# Patient Record
Sex: FEMALE | Race: WHITE | Employment: FULL TIME | URBAN - METROPOLITAN AREA
[De-identification: names, ages, dates, MRNs, and addresses within clinical notes are randomized per-mention and may not be internally consistent; named-entity substitution may affect disease eponyms.]

---

## 2017-05-31 ENCOUNTER — HOSPITAL ENCOUNTER (OUTPATIENT)
Age: 47
Discharge: HOME | End: 2017-05-31
Payer: COMMERCIAL

## 2017-05-31 ENCOUNTER — APPOINTMENT (OUTPATIENT)
Dept: ULTRASOUND IMAGING | Age: 47
End: 2017-05-31
Attending: PHYSICIAN ASSISTANT
Payer: COMMERCIAL

## 2017-05-31 ENCOUNTER — TELEPHONE (OUTPATIENT)
Dept: FAMILY MEDICINE CLINIC | Facility: CLINIC | Age: 47
End: 2017-05-31

## 2017-05-31 VITALS
HEIGHT: 62.5 IN | WEIGHT: 198 LBS | HEART RATE: 87 BPM | DIASTOLIC BLOOD PRESSURE: 101 MMHG | BODY MASS INDEX: 35.52 KG/M2 | OXYGEN SATURATION: 99 % | SYSTOLIC BLOOD PRESSURE: 154 MMHG | RESPIRATION RATE: 20 BRPM | TEMPERATURE: 99 F

## 2017-05-31 DIAGNOSIS — R52 PAIN: ICD-10-CM

## 2017-05-31 DIAGNOSIS — N92.0 HEAVY MENSTRUAL BLEEDING: ICD-10-CM

## 2017-05-31 DIAGNOSIS — N92.1 MENOMETRORRHAGIA: ICD-10-CM

## 2017-05-31 DIAGNOSIS — D50.9 IRON DEFICIENCY ANEMIA, UNSPECIFIED IRON DEFICIENCY ANEMIA TYPE: ICD-10-CM

## 2017-05-31 DIAGNOSIS — N83.202 OVARIAN CYST, LEFT: Primary | ICD-10-CM

## 2017-05-31 PROCEDURE — 81025 URINE PREGNANCY TEST: CPT | Performed by: PHYSICIAN ASSISTANT

## 2017-05-31 PROCEDURE — 96372 THER/PROPH/DIAG INJ SC/IM: CPT

## 2017-05-31 PROCEDURE — 85025 COMPLETE CBC W/AUTO DIFF WBC: CPT | Performed by: PHYSICIAN ASSISTANT

## 2017-05-31 PROCEDURE — 36415 COLL VENOUS BLD VENIPUNCTURE: CPT

## 2017-05-31 PROCEDURE — 99214 OFFICE O/P EST MOD 30 MIN: CPT

## 2017-05-31 PROCEDURE — 80047 BASIC METABLC PNL IONIZED CA: CPT

## 2017-05-31 PROCEDURE — 76830 TRANSVAGINAL US NON-OB: CPT | Performed by: PHYSICIAN ASSISTANT

## 2017-05-31 PROCEDURE — 81002 URINALYSIS NONAUTO W/O SCOPE: CPT | Performed by: PHYSICIAN ASSISTANT

## 2017-05-31 PROCEDURE — 99215 OFFICE O/P EST HI 40 MIN: CPT

## 2017-05-31 PROCEDURE — 76856 US EXAM PELVIC COMPLETE: CPT | Performed by: PHYSICIAN ASSISTANT

## 2017-05-31 RX ORDER — IBUPROFEN 800 MG/1
800 TABLET ORAL EVERY 8 HOURS PRN
Qty: 30 TABLET | Refills: 0 | Status: SHIPPED | OUTPATIENT
Start: 2017-05-31 | End: 2017-06-07

## 2017-05-31 RX ORDER — KETOROLAC TROMETHAMINE 30 MG/ML
60 INJECTION, SOLUTION INTRAMUSCULAR; INTRAVENOUS ONCE
Status: COMPLETED | OUTPATIENT
Start: 2017-05-31 | End: 2017-05-31

## 2017-05-31 NOTE — TELEPHONE ENCOUNTER
Pt had her period for 8 days (heavy) ended on 5/20/17. She just got it agin today and has burning sensation and bloody nose. What to do?

## 2017-05-31 NOTE — TELEPHONE ENCOUNTER
LOV 11/7/16 acute, physical 1/21/16. Starling Setting has a few complaints this morning. Menses ended 10 days ago but vaginal bleeding began again yesterday. This morning bleeding was heavier along with burning in the low abdomen at a pain level of 8.   She had a b

## 2017-05-31 NOTE — ED INITIAL ASSESSMENT (HPI)
Pt had her menstrual period on the may 21 then again may 30. Pt states its different. Also c/o burning in the stomach. Denies any urinary problems.

## 2017-05-31 NOTE — ED PROVIDER NOTES
Patient Seen in: [facility]    History   Patient presents with:  Menstrual Problem    Stated Complaint: IRREGULAR MENSES,BURNING VS CRAMPING,BLOODY NOSE THIS MORN    HPI    40 yo female here with c/o a irregular period-PT report Smokeless Status: Never Used                        Alcohol Use: Yes           0.0 oz/week       0 Standard drinks or equivalent per week       Comment: 0-1 drink per month      Review of Systems    Positive for stated complaint: IRREGULAR MEN normal limits   POCT CBC - Abnormal; Notable for the following:     RBC IC 5.44 (*)     HGB IC 10.0 (*)     HCT IC 34.6 (*)     MCV IC 63.6 (*)     MCH IC <20.0 (*)     MCHC IC 28.9 (*)     All other components within normal limits   POCT ISTAT CHEM8 CARTR

## 2017-06-02 ENCOUNTER — TELEPHONE (OUTPATIENT)
Dept: FAMILY MEDICINE CLINIC | Facility: CLINIC | Age: 47
End: 2017-06-02

## 2017-06-02 RX ORDER — NAPROXEN 500 MG/1
500 TABLET ORAL 2 TIMES DAILY WITH MEALS
Qty: 30 TABLET | Refills: 0 | Status: ON HOLD | OUTPATIENT
Start: 2017-06-02 | End: 2017-06-15

## 2017-06-02 NOTE — TELEPHONE ENCOUNTER
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  At urgent care they gave her ibuprofen that is not helping and is wanting to know if there is something else that can be pre

## 2017-06-02 NOTE — TELEPHONE ENCOUNTER
With her allergy hx the only thing I can recommend at this time is switching from ibuprofen to Naproxen 500 mg BID with food. #30 tabs can be called in to her pharmacy.

## 2017-06-02 NOTE — TELEPHONE ENCOUNTER
Called and talked to patient she is taking the motrin but it is not helping the pain she is wondering if she can get something else for the pain until she sees the specialist on Tuesday/ I will ask Jess BURRIS to address this.

## 2017-06-06 ENCOUNTER — OFFICE VISIT (OUTPATIENT)
Dept: OBGYN CLINIC | Facility: CLINIC | Age: 47
End: 2017-06-06

## 2017-06-06 VITALS
HEIGHT: 62 IN | SYSTOLIC BLOOD PRESSURE: 122 MMHG | WEIGHT: 212 LBS | HEART RATE: 96 BPM | BODY MASS INDEX: 39.01 KG/M2 | DIASTOLIC BLOOD PRESSURE: 72 MMHG

## 2017-06-06 DIAGNOSIS — Z01.419 WELL WOMAN EXAM WITH ROUTINE GYNECOLOGICAL EXAM: Primary | ICD-10-CM

## 2017-06-06 DIAGNOSIS — N83.209 CYST OF OVARY: ICD-10-CM

## 2017-06-06 DIAGNOSIS — Z87.42 HX OF MENORRHAGIA: ICD-10-CM

## 2017-06-06 DIAGNOSIS — Z12.31 VISIT FOR SCREENING MAMMOGRAM: ICD-10-CM

## 2017-06-06 PROCEDURE — 87624 HPV HI-RISK TYP POOLED RSLT: CPT | Performed by: OBSTETRICS & GYNECOLOGY

## 2017-06-06 PROCEDURE — 88175 CYTOPATH C/V AUTO FLUID REDO: CPT | Performed by: OBSTETRICS & GYNECOLOGY

## 2017-06-06 PROCEDURE — 99386 PREV VISIT NEW AGE 40-64: CPT | Performed by: OBSTETRICS & GYNECOLOGY

## 2017-06-06 NOTE — PROGRESS NOTES
Cecy Puente is a 41year old female  Patient's last menstrual period was 2017 (exact date). Patient presents with:  Gyn Problem: u/s results in EPIC  . Patient has periods that are usually 8-9 days she bleeds every month.   Does not use an Comment: 0-1 drink per month    Drug Use: No    Sexual Activity: Not on file   Not on file  Other Topics Concern    Caffeine Concern Yes    Comment: 6oz daily    Exercise Yes    Comment: 3 d/wk    Seat Belt Yes    Self-Exams Yes    Comment: self breast exa masses  Skin/Hair: no unusual rashes or bruises   Extremities: no edema, no cyanosis  Psychiatric:  Oriented to time, place, person and situation.  Appropriate mood and affect    Pelvic Exam:  External Genitalia: normal appearance, hair distribution, and no

## 2017-06-07 ENCOUNTER — APPOINTMENT (OUTPATIENT)
Dept: LAB | Facility: HOSPITAL | Age: 47
End: 2017-06-07
Attending: OBSTETRICS & GYNECOLOGY
Payer: COMMERCIAL

## 2017-06-07 DIAGNOSIS — Z12.31 VISIT FOR SCREENING MAMMOGRAM: ICD-10-CM

## 2017-06-07 DIAGNOSIS — Z01.419 WELL WOMAN EXAM WITH ROUTINE GYNECOLOGICAL EXAM: ICD-10-CM

## 2017-06-07 DIAGNOSIS — N83.209 CYST OF OVARY: ICD-10-CM

## 2017-06-07 DIAGNOSIS — Z87.42 HX OF MENORRHAGIA: ICD-10-CM

## 2017-06-07 PROCEDURE — 36415 COLL VENOUS BLD VENIPUNCTURE: CPT

## 2017-06-07 PROCEDURE — 84443 ASSAY THYROID STIM HORMONE: CPT

## 2017-06-07 PROCEDURE — 86304 IMMUNOASSAY TUMOR CA 125: CPT

## 2017-06-09 ENCOUNTER — TELEPHONE (OUTPATIENT)
Dept: OBGYN CLINIC | Facility: CLINIC | Age: 47
End: 2017-06-09

## 2017-06-09 RX ORDER — HEPARIN SODIUM 5000 [USP'U]/ML
5000 INJECTION, SOLUTION INTRAVENOUS; SUBCUTANEOUS ONCE
Status: CANCELLED | OUTPATIENT
Start: 2017-06-09 | End: 2017-06-09

## 2017-06-09 RX ORDER — IBUPROFEN 800 MG/1
800 TABLET ORAL EVERY 6 HOURS PRN
Status: ON HOLD | COMMUNITY
End: 2017-06-15

## 2017-06-09 NOTE — TELEPHONE ENCOUNTER
Pt would like to have a hysterectomy instead of ablation. Currently ablation and laparoscopic systectomy is scheduled 06/29/17, pt states cannot wait this long.  Will check with MD

## 2017-06-13 ENCOUNTER — TELEPHONE (OUTPATIENT)
Dept: OBGYN CLINIC | Facility: CLINIC | Age: 47
End: 2017-06-13

## 2017-06-13 ENCOUNTER — OFFICE VISIT (OUTPATIENT)
Dept: OBGYN CLINIC | Facility: CLINIC | Age: 47
End: 2017-06-13

## 2017-06-13 VITALS
SYSTOLIC BLOOD PRESSURE: 130 MMHG | BODY MASS INDEX: 39.01 KG/M2 | HEIGHT: 62 IN | DIASTOLIC BLOOD PRESSURE: 70 MMHG | WEIGHT: 212 LBS | HEART RATE: 88 BPM | RESPIRATION RATE: 18 BRPM

## 2017-06-13 DIAGNOSIS — R35.0 FREQUENT URINATION: ICD-10-CM

## 2017-06-13 DIAGNOSIS — R35.0 FREQUENT URINATION: Primary | ICD-10-CM

## 2017-06-13 DIAGNOSIS — N83.209 OVARIAN CYST: ICD-10-CM

## 2017-06-13 DIAGNOSIS — R10.2 PELVIC PAIN: ICD-10-CM

## 2017-06-13 DIAGNOSIS — N39.0 URINARY TRACT INFECTION, SITE UNSPECIFIED: Primary | ICD-10-CM

## 2017-06-13 PROCEDURE — 87086 URINE CULTURE/COLONY COUNT: CPT | Performed by: OBSTETRICS & GYNECOLOGY

## 2017-06-13 PROCEDURE — 99213 OFFICE O/P EST LOW 20 MIN: CPT | Performed by: OBSTETRICS & GYNECOLOGY

## 2017-06-13 PROCEDURE — 81003 URINALYSIS AUTO W/O SCOPE: CPT | Performed by: OBSTETRICS & GYNECOLOGY

## 2017-06-13 RX ORDER — HEPARIN SODIUM 5000 [USP'U]/ML
5000 INJECTION, SOLUTION INTRAVENOUS; SUBCUTANEOUS ONCE
Status: CANCELLED | OUTPATIENT
Start: 2017-06-13 | End: 2017-06-13

## 2017-06-13 RX ORDER — TRAMADOL HYDROCHLORIDE 50 MG/1
50 TABLET ORAL EVERY 6 HOURS PRN
Status: ON HOLD | COMMUNITY
End: 2017-06-15

## 2017-06-13 NOTE — PROGRESS NOTES
Antonia Thomas is a 41year old female  Patient's last menstrual period was 2017 (exact date).  Patient presents with:  Pre-Op Exam: ovarian cyst.   .  C/o severe pelvic pain , take pain medications around the clock, scheduled for ovarian cysteco Concern Yes    Comment: 6oz daily    Exercise Yes    Comment: 3 d/wk    Seat Belt Yes    Self-Exams Yes    Comment: self breast exam once a week     Social History Narrative       FAMILY HISTORY:  No family history on file.     MEDICATIONS:    Current outpa ovarian avascular anechoic cyst measuring 6.8 x 4.6 x 6.2 cm. There was a 2.8 x 2.5 cm cyst within the left ovary seen on a CT dated 9/11/16. If this is the same cyst this is obviously larger.  The differential diagnosis   would include simple cyst, cystic

## 2017-06-13 NOTE — TELEPHONE ENCOUNTER
Dr. Rissa Burkett,    Did you want to send out this patients urine for a culture? If so can you please enter in the order. Thank you.

## 2017-06-15 ENCOUNTER — ANESTHESIA EVENT (OUTPATIENT)
Dept: SURGERY | Facility: HOSPITAL | Age: 47
DRG: 743 | End: 2017-06-15
Payer: COMMERCIAL

## 2017-06-15 ENCOUNTER — HOSPITAL ENCOUNTER (INPATIENT)
Facility: HOSPITAL | Age: 47
LOS: 3 days | Discharge: HOME | DRG: 743 | End: 2017-06-18
Attending: OBSTETRICS & GYNECOLOGY | Admitting: OBSTETRICS & GYNECOLOGY
Payer: COMMERCIAL

## 2017-06-15 ENCOUNTER — ANESTHESIA (OUTPATIENT)
Dept: SURGERY | Facility: HOSPITAL | Age: 47
DRG: 743 | End: 2017-06-15
Payer: COMMERCIAL

## 2017-06-15 ENCOUNTER — SURGERY (OUTPATIENT)
Age: 47
End: 2017-06-15

## 2017-06-15 PROBLEM — Z90.710 S/P TAH (TOTAL ABDOMINAL HYSTERECTOMY): Status: ACTIVE | Noted: 2017-06-15

## 2017-06-15 PROCEDURE — 0UT10ZZ RESECTION OF LEFT OVARY, OPEN APPROACH: ICD-10-PCS | Performed by: OBSTETRICS & GYNECOLOGY

## 2017-06-15 PROCEDURE — 0UT90ZZ RESECTION OF UTERUS, OPEN APPROACH: ICD-10-PCS | Performed by: OBSTETRICS & GYNECOLOGY

## 2017-06-15 PROCEDURE — 58150 TOTAL HYSTERECTOMY: CPT | Performed by: OBSTETRICS & GYNECOLOGY

## 2017-06-15 PROCEDURE — 0UT70ZZ RESECTION OF BILATERAL FALLOPIAN TUBES, OPEN APPROACH: ICD-10-PCS | Performed by: OBSTETRICS & GYNECOLOGY

## 2017-06-15 PROCEDURE — 0UTC0ZZ RESECTION OF CERVIX, OPEN APPROACH: ICD-10-PCS | Performed by: OBSTETRICS & GYNECOLOGY

## 2017-06-15 RX ORDER — DIPHENHYDRAMINE HYDROCHLORIDE 50 MG/ML
12.5 INJECTION INTRAMUSCULAR; INTRAVENOUS EVERY 4 HOURS PRN
Status: DISCONTINUED | OUTPATIENT
Start: 2017-06-15 | End: 2017-06-18

## 2017-06-15 RX ORDER — MIDAZOLAM HYDROCHLORIDE 1 MG/ML
INJECTION INTRAMUSCULAR; INTRAVENOUS
Status: COMPLETED
Start: 2017-06-15 | End: 2017-06-15

## 2017-06-15 RX ORDER — SIMETHICONE 80 MG
80 TABLET,CHEWABLE ORAL EVERY 8 HOURS PRN
Status: DISCONTINUED | OUTPATIENT
Start: 2017-06-15 | End: 2017-06-18

## 2017-06-15 RX ORDER — DEXTROSE, SODIUM CHLORIDE, SODIUM LACTATE, POTASSIUM CHLORIDE, AND CALCIUM CHLORIDE 5; .6; .31; .03; .02 G/100ML; G/100ML; G/100ML; G/100ML; G/100ML
INJECTION, SOLUTION INTRAVENOUS CONTINUOUS
Status: DISCONTINUED | OUTPATIENT
Start: 2017-06-15 | End: 2017-06-17

## 2017-06-15 RX ORDER — HYDROCODONE BITARTRATE AND ACETAMINOPHEN 5; 325 MG/1; MG/1
2 TABLET ORAL EVERY 4 HOURS PRN
Status: DISCONTINUED | OUTPATIENT
Start: 2017-06-15 | End: 2017-06-18

## 2017-06-15 RX ORDER — IBUPROFEN 600 MG/1
600 TABLET ORAL EVERY 6 HOURS PRN
Status: DISCONTINUED | OUTPATIENT
Start: 2017-06-15 | End: 2017-06-18

## 2017-06-15 RX ORDER — ONDANSETRON 4 MG/1
4 TABLET, FILM COATED ORAL EVERY 8 HOURS PRN
Status: DISCONTINUED | OUTPATIENT
Start: 2017-06-15 | End: 2017-06-18

## 2017-06-15 RX ORDER — HYDROCODONE BITARTRATE AND ACETAMINOPHEN 5; 325 MG/1; MG/1
1 TABLET ORAL EVERY 4 HOURS PRN
Status: DISCONTINUED | OUTPATIENT
Start: 2017-06-15 | End: 2017-06-18

## 2017-06-15 RX ORDER — HEPARIN SODIUM 5000 [USP'U]/ML
5000 INJECTION, SOLUTION INTRAVENOUS; SUBCUTANEOUS ONCE
Status: COMPLETED | OUTPATIENT
Start: 2017-06-15 | End: 2017-06-15

## 2017-06-15 RX ORDER — ONDANSETRON 2 MG/ML
4 INJECTION INTRAMUSCULAR; INTRAVENOUS EVERY 6 HOURS PRN
Status: DISCONTINUED | OUTPATIENT
Start: 2017-06-15 | End: 2017-06-18

## 2017-06-15 RX ORDER — ZOLPIDEM TARTRATE 5 MG/1
5 TABLET ORAL NIGHTLY PRN
Status: DISCONTINUED | OUTPATIENT
Start: 2017-06-15 | End: 2017-06-18

## 2017-06-15 RX ORDER — ONDANSETRON 2 MG/ML
4 INJECTION INTRAMUSCULAR; INTRAVENOUS AS NEEDED
Status: DISCONTINUED | OUTPATIENT
Start: 2017-06-15 | End: 2017-06-15 | Stop reason: HOSPADM

## 2017-06-15 RX ORDER — NALBUPHINE HCL 10 MG/ML
2.5 AMPUL (ML) INJECTION EVERY 4 HOURS PRN
Status: DISCONTINUED | OUTPATIENT
Start: 2017-06-15 | End: 2017-06-18

## 2017-06-15 RX ORDER — METOCLOPRAMIDE HYDROCHLORIDE 5 MG/ML
10 INJECTION INTRAMUSCULAR; INTRAVENOUS AS NEEDED
Status: DISCONTINUED | OUTPATIENT
Start: 2017-06-15 | End: 2017-06-15 | Stop reason: HOSPADM

## 2017-06-15 RX ORDER — ONDANSETRON 2 MG/ML
INJECTION INTRAMUSCULAR; INTRAVENOUS
Status: COMPLETED
Start: 2017-06-15 | End: 2017-06-15

## 2017-06-15 RX ORDER — NALOXONE HYDROCHLORIDE 0.4 MG/ML
80 INJECTION, SOLUTION INTRAMUSCULAR; INTRAVENOUS; SUBCUTANEOUS AS NEEDED
Status: DISCONTINUED | OUTPATIENT
Start: 2017-06-15 | End: 2017-06-15 | Stop reason: HOSPADM

## 2017-06-15 RX ORDER — HYDROMORPHONE HYDROCHLORIDE 1 MG/ML
0.4 INJECTION, SOLUTION INTRAMUSCULAR; INTRAVENOUS; SUBCUTANEOUS EVERY 30 MIN PRN
Status: DISCONTINUED | OUTPATIENT
Start: 2017-06-15 | End: 2017-06-18

## 2017-06-15 RX ORDER — SODIUM CHLORIDE, SODIUM LACTATE, POTASSIUM CHLORIDE, CALCIUM CHLORIDE 600; 310; 30; 20 MG/100ML; MG/100ML; MG/100ML; MG/100ML
INJECTION, SOLUTION INTRAVENOUS CONTINUOUS
Status: DISCONTINUED | OUTPATIENT
Start: 2017-06-15 | End: 2017-06-15 | Stop reason: HOSPADM

## 2017-06-15 RX ORDER — MEPERIDINE HYDROCHLORIDE 25 MG/ML
INJECTION INTRAMUSCULAR; INTRAVENOUS; SUBCUTANEOUS
Status: COMPLETED
Start: 2017-06-15 | End: 2017-06-15

## 2017-06-15 RX ORDER — MEPERIDINE HYDROCHLORIDE 25 MG/ML
12.5 INJECTION INTRAMUSCULAR; INTRAVENOUS; SUBCUTANEOUS AS NEEDED
Status: COMPLETED | OUTPATIENT
Start: 2017-06-15 | End: 2017-06-15

## 2017-06-15 RX ORDER — MIDAZOLAM HYDROCHLORIDE 1 MG/ML
1 INJECTION INTRAMUSCULAR; INTRAVENOUS EVERY 5 MIN PRN
Status: DISCONTINUED | OUTPATIENT
Start: 2017-06-15 | End: 2017-06-15 | Stop reason: HOSPADM

## 2017-06-15 RX ORDER — METOCLOPRAMIDE HYDROCHLORIDE 5 MG/ML
INJECTION INTRAMUSCULAR; INTRAVENOUS
Status: COMPLETED
Start: 2017-06-15 | End: 2017-06-15

## 2017-06-15 RX ORDER — SODIUM CHLORIDE, SODIUM LACTATE, POTASSIUM CHLORIDE, CALCIUM CHLORIDE 600; 310; 30; 20 MG/100ML; MG/100ML; MG/100ML; MG/100ML
INJECTION, SOLUTION INTRAVENOUS CONTINUOUS
Status: DISCONTINUED | OUTPATIENT
Start: 2017-06-15 | End: 2017-06-17

## 2017-06-15 RX ORDER — HEPARIN SODIUM 5000 [USP'U]/ML
INJECTION, SOLUTION INTRAVENOUS; SUBCUTANEOUS
Status: DISPENSED
Start: 2017-06-15 | End: 2017-06-16

## 2017-06-15 RX ORDER — HYDROMORPHONE HYDROCHLORIDE 1 MG/ML
0.4 INJECTION, SOLUTION INTRAMUSCULAR; INTRAVENOUS; SUBCUTANEOUS EVERY 5 MIN PRN
Status: DISCONTINUED | OUTPATIENT
Start: 2017-06-15 | End: 2017-06-15 | Stop reason: HOSPADM

## 2017-06-15 RX ORDER — DOCUSATE SODIUM 100 MG/1
100 CAPSULE, LIQUID FILLED ORAL 2 TIMES DAILY
Status: DISCONTINUED | OUTPATIENT
Start: 2017-06-15 | End: 2017-06-18

## 2017-06-15 RX ORDER — ONDANSETRON 2 MG/ML
4 INJECTION INTRAMUSCULAR; INTRAVENOUS EVERY 8 HOURS PRN
Status: DISCONTINUED | OUTPATIENT
Start: 2017-06-15 | End: 2017-06-18

## 2017-06-15 RX ORDER — HYDROMORPHONE HYDROCHLORIDE 1 MG/ML
INJECTION, SOLUTION INTRAMUSCULAR; INTRAVENOUS; SUBCUTANEOUS
Status: COMPLETED
Start: 2017-06-15 | End: 2017-06-15

## 2017-06-15 RX ORDER — NALOXONE HYDROCHLORIDE 0.4 MG/ML
0.08 INJECTION, SOLUTION INTRAMUSCULAR; INTRAVENOUS; SUBCUTANEOUS
Status: DISCONTINUED | OUTPATIENT
Start: 2017-06-15 | End: 2017-06-18

## 2017-06-15 RX ORDER — METOCLOPRAMIDE HYDROCHLORIDE 5 MG/ML
10 INJECTION INTRAMUSCULAR; INTRAVENOUS EVERY 8 HOURS PRN
Status: DISCONTINUED | OUTPATIENT
Start: 2017-06-15 | End: 2017-06-18

## 2017-06-15 NOTE — BRIEF OP NOTE
Pre-Operative Diagnosis: Left ovarian cyst, pelvic pain, heavy menses     Post-Operative Diagnosis: Left ovarian cyst, pelvic pain, heavy menses     Procedure Performed:   Procedure(s):  TOTAL ABDOMINAL HYSTERECTOMY, BILATERAL SALPINGECTOMY, LEFT OOPHER

## 2017-06-15 NOTE — ANESTHESIA POSTPROCEDURE EVALUATION
[address] Patient Status:  Hospital Outpatient Surgery   Age/Gender 41year old female MRN [id number]   [facility] SUR[facility]ERY Attending Manny Fam, [address] [facility] Day # 0 PCP Ava Alarcon MD       Anesthesia Post-op N

## 2017-06-15 NOTE — H&P
Tamiko Kahn is a 41year old female  Patient's last menstrual period was 2017 (exact date). No chief complaint on file.   .  C/o severe pelvic pain , take pain medications around the clock, scheduled for ovarian cystecomy and possible novasure daily    Exercise Yes    Comment: 3 d/wk    Seat Belt Yes    Self-Exams Yes    Comment: self breast exam once a week     Social History Narrative       FAMILY HISTORY:  History reviewed. No pertinent family history.     MEDICATIONS:  No current outpatient p fullness, masses or tenderness  Vagina:  Normal appearance without lesions, no abnormal discharge  Cervix:  Normal without tenderness on motion  Uterus: tender to palpation  Adnexa: left adnexal fullness and tenderness  Perineum: normal  Anus: no hemorroid

## 2017-06-15 NOTE — ANESTHESIA PREPROCEDURE EVALUATION
PRE-OP EVALUATION    Patient Name: Tyrell Davis    Pre-op Diagnosis: OVARIAN CYST    Procedure(s):  TOTAL ABDOMINAL HYSTERECTOMY    Surgeon(s) and Role:     * Juliette Arevalo DO - Primary     * Bryan Calderon MD - Assisting Surgeon    Pre-op vitals Sergei Skill Surgical History    OTHER SURGICAL HISTORY  6/8/2011    Comment liver biopsy    TONSILLECTOMY  2/2003    APPENDECTOMY      Comment 9/2016        Smoking status: Never Smoker     Smokeless tobacco: Never Used    Alcohol Use: Yes  0.0 oz/week    0 Standard d

## 2017-06-16 NOTE — PROGRESS NOTES
HD#1  Pain is controlled wit PCA, no nausea  /84 mmHg  Pulse 81  Temp(Src) 97.9 °F (36.6 °C) (Oral)  Resp 20  Ht 62\"  Wt 203 lb  BMI 37.12 kg/m2  SpO2 100%  LMP 05/30/2017 (Exact Date)    Recent Labs   Lab  06/16/17   0554   RBC  4.59   HGB  8.2*

## 2017-06-16 NOTE — PLAN OF CARE
Verbalizes/displays adequate comfort level or patient's stated pain goal Progressing      Incision(s), wounds(s) or drain site(s) healing without S/S of infection Progressing      Pt resting in bed this morning, reporting abdominal pressure.  Pt using pca,

## 2017-06-16 NOTE — PROGRESS NOTES
[facility] Pharmacy Note:  Pain Consult    Carlton De La Cruz is a 41year old female started on Dilaudid PCA by Dr. Yuliya Sarmiento. Pharmacy was consulted to review medication profile and to discontinue previously ordered narcotics and sedatives.     Medication profil

## 2017-06-16 NOTE — PAYOR COMM NOTE
--------------  ADMISSION REVIEW     Payor: Maria C Flor  #:  [id number]  Authorization Number: [id number]    Admit date: 6/15/2017  2:35 PM       Admitting Physician: Bola Castaneda DO  Attending Physician:  DO Holly Siegel liver biopsy   • Tonsillectomy  2/2003   • Appendectomy       9/2016       SOCIAL HISTORY:    Social History   Marital Status:   Spouse Name: N/A    Years of Education: N/A  Number of Children: N/A     Occupational History                FAM no lesions  Urethral Meatus:  normal in size, location, without lesions and prolapse  Bladder:  No fullness, masses or tenderness  Vagina:  Normal appearance without lesions, no abnormal discharge  Cervix:  Normal without tenderness on motion  Uterus: tend (DEMEROL) 25 MG/ML injection 12.5 mg     Date Action Dose Route User    6/15/2017 1818 Given 12.5 mg Intravenous Jerald English RN    6/15/2017 1813 Given 12.5 mg Intravenous Nicolás Melvin RN      Metoclopramide HCl (REGLAN) injection 10 mg     Date Act

## 2017-06-17 RX ORDER — KETOROLAC TROMETHAMINE 30 MG/ML
30 INJECTION, SOLUTION INTRAMUSCULAR; INTRAVENOUS EVERY 6 HOURS PRN
Status: DISCONTINUED | OUTPATIENT
Start: 2017-06-17 | End: 2017-06-18

## 2017-06-17 NOTE — PLAN OF CARE
DISCHARGE PLANNING    • Discharge to home or other facility with appropriate resources Progressing        RISK FOR INFECTION - ADULT    • Absence of fever/infection during anticipated neutropenic period Progressing        SAFETY ADULT - FALL    • Free from

## 2017-06-17 NOTE — PROGRESS NOTES
[facility]  Progress Note    Tyrell Davis Patient Status:  Inpatient    1970 MRN [id number]   [facility] 3NW-A Attending Juliette Arevalo, [address] [facility] Day # 2 PCP Minerva Ramirez MD     Subjective:  C/o of pain and nausea.  Does n

## 2017-06-17 NOTE — PLAN OF CARE
Pt continues to c/o increased abdominal pain and avoiding using PCA for suspected cause of nausea this am. Pt has not attempted to eat since this am. Pt encouraged to try full liquid menu now.  Dr. Elmira Sandy paged and notified of pain and nausea this am. PCA d/c

## 2017-06-18 VITALS
TEMPERATURE: 98 F | SYSTOLIC BLOOD PRESSURE: 148 MMHG | HEIGHT: 62 IN | WEIGHT: 203 LBS | RESPIRATION RATE: 18 BRPM | HEART RATE: 87 BPM | OXYGEN SATURATION: 98 % | DIASTOLIC BLOOD PRESSURE: 94 MMHG | BODY MASS INDEX: 37.36 KG/M2

## 2017-06-18 RX ORDER — HYDROCODONE BITARTRATE AND ACETAMINOPHEN 5; 325 MG/1; MG/1
1 TABLET ORAL EVERY 4 HOURS PRN
Qty: 30 TABLET | Refills: 0 | Status: SHIPPED | OUTPATIENT
Start: 2017-06-18 | End: 2017-07-27

## 2017-06-18 NOTE — PLAN OF CARE
DISCHARGE PLANNING    • Discharge to home or other facility with appropriate resources Adequate for Discharge        GASTROINTESTINAL - ADULT    • Minimal or absence of nausea and vomiting Adequate for Discharge    • Maintains or returns to baseline bowel

## 2017-06-18 NOTE — DISCHARGE SUMMARY
[facility]  Discharge Summary    Antonia Thomas Patient Status:  Inpatient    1970 MRN [id number]   [facility] 3NW-A Attending Rahel Lopez, [address] [facility] Day # 3 PCP Joselito Langston MD     Date of Admission: 6/15/2017    Date of

## 2017-06-20 NOTE — OPERATIVE REPORT
[facility]    PATIENT'S NAME: Nicole Maren   ATTENDING PHYSICIAN: [facility]   OPERATING PHYSICIAN: [facility]   PATIENT ACCOUNT#:   [id number]    LOCATION:  [address] [place] [address]  MEDICAL RECORD #:   [id number]       DATE OF BIRTH: then skeletonized bilaterally, transected and suture ligated bilaterally with good hemostasis. Uterosacral and cardinal ligaments transected and suture ligated. Uterus and cervix amputated using Cortez scissors.   Vaginal cuff was then closed with 0 Vi

## 2017-06-20 NOTE — PAYOR COMM NOTE
--------------  DISCHARGE REVIEW    Payor: Maria C Flor  #:  [id number]  Authorization Number: [id number]    Admit date: 6/15/2017  2:35 PM  Discharge Date: 6/18/2017  1:00 PM     Admitting Physician: DO Twan Mckeon contact dermatitis due to detergent    acyclovir 5 % External Ointment  Apply thin layer to affected area every 3 hours until resolved  Qty: 5 g Refills: 3  Associated Diagnoses:Herpes labialis    RaNITidine HCl 150 MG Oral Tab  Take 1 tablet (150 mg total PCA. Advance diet as tolerated.  Probable D/c tomorrow     Patient Active Problem List:     Trigeminal neuralgia     Anemia, unspecified     Primary biliary cirrhosis (HCC)     Vitamin D deficiency     Well woman exam with routine gynecological exam     Cys

## 2017-07-14 ENCOUNTER — OFFICE VISIT (OUTPATIENT)
Dept: OBGYN CLINIC | Facility: CLINIC | Age: 47
End: 2017-07-14

## 2017-07-14 VITALS
BODY MASS INDEX: 36.8 KG/M2 | RESPIRATION RATE: 16 BRPM | HEIGHT: 62 IN | WEIGHT: 200 LBS | HEART RATE: 100 BPM | DIASTOLIC BLOOD PRESSURE: 100 MMHG | SYSTOLIC BLOOD PRESSURE: 138 MMHG

## 2017-07-14 DIAGNOSIS — IMO0001: Primary | ICD-10-CM

## 2017-07-14 NOTE — PROGRESS NOTES
Tanja Marques is a 41year old female  Patient's last menstrual period was 2017 (exact date). Patient presents with:  Post-Op: incision is bleeding   .   S/p BENITEZ RSO 6/15/17 c/o small opening at the incision with bloody discharge, no fever  OBST Prescriptions:   •  triamcinolone acetonide 0.1 % External Cream, Apply topically 2 (two) times daily as needed. , Disp: 60 g, Rfl: 1  •  acyclovir 5 % External Ointment, Apply thin layer to affected area every 3 hours until resolved, Disp: 5 g, Rfl: 3  •

## 2017-07-27 ENCOUNTER — OFFICE VISIT (OUTPATIENT)
Dept: OBGYN CLINIC | Facility: CLINIC | Age: 47
End: 2017-07-27

## 2017-07-27 VITALS
WEIGHT: 202 LBS | BODY MASS INDEX: 37.17 KG/M2 | HEART RATE: 80 BPM | DIASTOLIC BLOOD PRESSURE: 60 MMHG | SYSTOLIC BLOOD PRESSURE: 120 MMHG | HEIGHT: 62 IN

## 2017-07-27 DIAGNOSIS — Z09 POSTOP CHECK: Primary | ICD-10-CM

## 2017-07-27 PROCEDURE — 99024 POSTOP FOLLOW-UP VISIT: CPT | Performed by: OBSTETRICS & GYNECOLOGY

## 2017-07-27 NOTE — PROGRESS NOTES
Reviewed surgerical pictures, events, path report. All questions answered.  Released from pelvic rest  Incisions healed  Patient has no complaints    Pelvic: vaginal cuff healed, no adnexal mass no tenderness    A/P; s/p BENITEZ BS LSO  - doing well  - f/u prn

## 2017-08-30 NOTE — PLAN OF CARE
DISCHARGE PLANNING    • Discharge to home or other facility with appropriate resources Progressing        GASTROINTESTINAL - ADULT    • Minimal or absence of nausea and vomiting Progressing    • Maintains or returns to baseline bowel function Progressing none

## 2017-11-05 ENCOUNTER — OFFICE VISIT (OUTPATIENT)
Dept: FAMILY MEDICINE CLINIC | Facility: CLINIC | Age: 47
End: 2017-11-05

## 2017-11-05 VITALS
RESPIRATION RATE: 16 BRPM | DIASTOLIC BLOOD PRESSURE: 86 MMHG | OXYGEN SATURATION: 98 % | HEART RATE: 114 BPM | BODY MASS INDEX: 38.16 KG/M2 | HEIGHT: 62 IN | SYSTOLIC BLOOD PRESSURE: 134 MMHG | WEIGHT: 207.38 LBS | TEMPERATURE: 98 F

## 2017-11-05 DIAGNOSIS — J40 BRONCHITIS: Primary | ICD-10-CM

## 2017-11-05 PROCEDURE — 99213 OFFICE O/P EST LOW 20 MIN: CPT | Performed by: NURSE PRACTITIONER

## 2017-11-05 RX ORDER — PREDNISONE 20 MG/1
20 TABLET ORAL 2 TIMES DAILY
Qty: 10 TABLET | Refills: 0 | Status: SHIPPED | OUTPATIENT
Start: 2017-11-05 | End: 2017-11-10

## 2017-11-05 RX ORDER — DOXYCYCLINE HYCLATE 100 MG
100 TABLET ORAL 2 TIMES DAILY
Qty: 14 TABLET | Refills: 0 | Status: SHIPPED | OUTPATIENT
Start: 2017-11-05 | End: 2017-11-12

## 2017-11-05 RX ORDER — CODEINE PHOSPHATE AND GUAIFENESIN 10; 100 MG/5ML; MG/5ML
10 SOLUTION ORAL NIGHTLY PRN
Qty: 118 ML | Refills: 0 | Status: SHIPPED | OUTPATIENT
Start: 2017-11-05 | End: 2017-11-12

## 2017-11-05 NOTE — PATIENT INSTRUCTIONS
Humidifier in room  Sleep propped  Push fluids  Limit dairy  Mucinex as directed    Bronchitis, Antibiotic Treatment (Adult)    Bronchitis is an infection of the air passages (bronchial tubes) in your lungs. It often occurs when you have a cold.  This ill · Finish all antibiotic medicine. Do this even if you are feeling better after only a few days. Follow-up care  Follow up with your healthcare provider, or as advised. If you had an X-ray or ECG (electrocardiogram), a specialist will review it.  You will b

## 2017-11-05 NOTE — PROGRESS NOTES
CHIEF COMPLAINT:   Patient presents with:  Cough: cough, aches, difficulty sleeping, chest pain, stuffy nose, fatigue, cannot cough up phlegm x2 weeks Pt has not taken meds, just cough drops        HPI:   Antonia Thomas is a 41year old female who present Smoking status: Never Smoker                                                              Smokeless tobacco: Never Used                      Alcohol use: Yes           0.0 oz/week     Comment: 0-1 drink per month       REVIEW OF SYSTEMS:   GENERAL: denies Side effects, risks, benefits, of medication explained and discussed.     Patient Instructions     Humidifier in room  Sleep propped  Push fluids  Limit dairy  Mucinex as directed    Bronchitis, Antibiotic Treatment (Adult)    Bronchitis is an infecti · Over-the-counter cough, cold, and sore-throat medicines will not shorten the length of the illness, but they may be helpful to reduce symptoms. (Note: Do not use decongestants if you have high blood pressure.)  · Finish all antibiotic medicine.  Do this e

## 2018-07-22 ENCOUNTER — HOSPITAL ENCOUNTER (OUTPATIENT)
Age: 48
Discharge: HOME | End: 2018-07-22
Payer: COMMERCIAL

## 2018-07-22 VITALS
RESPIRATION RATE: 20 BRPM | BODY MASS INDEX: 38.64 KG/M2 | WEIGHT: 210 LBS | HEIGHT: 62 IN | HEART RATE: 81 BPM | OXYGEN SATURATION: 97 % | SYSTOLIC BLOOD PRESSURE: 173 MMHG | TEMPERATURE: 98 F | DIASTOLIC BLOOD PRESSURE: 92 MMHG

## 2018-07-22 DIAGNOSIS — R51.9 LEFT FACIAL PRESSURE AND PAIN: Primary | ICD-10-CM

## 2018-07-22 DIAGNOSIS — Z86.69 HISTORY OF TRIGEMINAL NEURALGIA: ICD-10-CM

## 2018-07-22 PROCEDURE — 99213 OFFICE O/P EST LOW 20 MIN: CPT

## 2018-07-22 PROCEDURE — 99214 OFFICE O/P EST MOD 30 MIN: CPT

## 2018-07-22 RX ORDER — METHYLPREDNISOLONE 4 MG/1
TABLET ORAL
Qty: 1 PACKAGE | Refills: 0 | Status: SHIPPED | OUTPATIENT
Start: 2018-07-22 | End: 2019-02-11

## 2018-07-22 NOTE — ED INITIAL ASSESSMENT (HPI)
Left side face pain - started  Last night. Today worse. Pt took hydrocodone this morning but no relief. Pt states she has trigeminal neuralgia dx 5-6 yrs ago. Pt states her doctor has retired. Pt wants pain medication.

## 2018-07-22 NOTE — ED PROVIDER NOTES
Patient Seen in: [facility]    History   Patient presents with:  Pain    Stated Complaint: FACE PAIN/HEAD ACHE    HPI    CHIEF COMPLAINT: Facial pain     HISTORY OF PRESENT ILLNESS: Patient is a 42-year-old female who presents w [facility])    • Sprain of neck        Past Surgical History:  No date: APPENDECTOMY      Comment: 9/2016  06/15/2017: HYSTERECTOMY  6/8/2011: OTHER SURGICAL HISTORY      Comment: liver biopsy  2/2003: TONSILLECTOMY    Family history reviewed and is not pertinen Mild tenderness to palpation of the left preauricular and maxillary areas. No overlying skin change. No edema, erythema or warmth noted. No fluctuance. Skin:  warm and dry, no rashes. No jaundice.  Brisk capillary refill  Musculoskeletal: neck is supp

## 2018-07-23 ENCOUNTER — OFFICE VISIT (OUTPATIENT)
Dept: FAMILY MEDICINE CLINIC | Facility: CLINIC | Age: 48
End: 2018-07-23
Payer: COMMERCIAL

## 2018-07-23 VITALS
BODY MASS INDEX: 38.83 KG/M2 | HEART RATE: 76 BPM | TEMPERATURE: 98 F | SYSTOLIC BLOOD PRESSURE: 124 MMHG | RESPIRATION RATE: 14 BRPM | WEIGHT: 211 LBS | DIASTOLIC BLOOD PRESSURE: 72 MMHG | HEIGHT: 62 IN

## 2018-07-23 DIAGNOSIS — K74.3 PRIMARY BILIARY CIRRHOSIS (HCC): ICD-10-CM

## 2018-07-23 DIAGNOSIS — R71.8 MICROCYTOSIS: ICD-10-CM

## 2018-07-23 DIAGNOSIS — G50.0 TRIGEMINAL NEURALGIA OF LEFT SIDE OF FACE: Primary | ICD-10-CM

## 2018-07-23 DIAGNOSIS — E61.1 IRON DEFICIENCY: ICD-10-CM

## 2018-07-23 DIAGNOSIS — Z79.899 HIGH RISK MEDICATION USE: ICD-10-CM

## 2018-07-23 PROCEDURE — 80053 COMPREHEN METABOLIC PANEL: CPT | Performed by: FAMILY MEDICINE

## 2018-07-23 PROCEDURE — 83540 ASSAY OF IRON: CPT | Performed by: FAMILY MEDICINE

## 2018-07-23 PROCEDURE — 83550 IRON BINDING TEST: CPT | Performed by: FAMILY MEDICINE

## 2018-07-23 PROCEDURE — 99214 OFFICE O/P EST MOD 30 MIN: CPT | Performed by: FAMILY MEDICINE

## 2018-07-23 PROCEDURE — 82728 ASSAY OF FERRITIN: CPT | Performed by: FAMILY MEDICINE

## 2018-07-23 PROCEDURE — 81003 URINALYSIS AUTO W/O SCOPE: CPT | Performed by: FAMILY MEDICINE

## 2018-07-23 PROCEDURE — 85027 COMPLETE CBC AUTOMATED: CPT | Performed by: FAMILY MEDICINE

## 2018-07-23 NOTE — PROGRESS NOTES
Chief Complaint:  Patient presents with:  Urgent Care F/u: Neuralgia    HPI:  This is a 57year old female patient presenting for Urgent Care F/u (Neuralgia)    Has hx of trigeminal neuralgia. This was in 2013 (5 years ago).  At that time took a month to [facility] Past Surgical History:  No date: APPENDECTOMY      Comment: 9/2016  06/15/2017: HYSTERECTOMY  6/8/2011: OTHER SURGICAL HISTORY      Comment: liver biopsy  2/2003: TONSILLECTOMY   No family history on file.    Smoking status: Never Smoker COMP METABOLIC PANEL (14)    URINALYSIS, ROUTINE    High risk medication use        Relevant Orders    CBC, PLATELET; NO DIFFERENTIAL    COMP METABOLIC PANEL (14)    URINALYSIS, ROUTINE    Iron deficiency        Relevant Orders    IRON AND TIBC    FERRITIN

## 2018-07-24 ENCOUNTER — TELEPHONE (OUTPATIENT)
Dept: FAMILY MEDICINE CLINIC | Facility: CLINIC | Age: 48
End: 2018-07-24

## 2018-07-24 LAB
ALBUMIN SERPL-MCNC: 3.5 G/DL (ref 3.5–4.8)
ALBUMIN/GLOB SERPL: 0.7 {RATIO} (ref 1–2)
ALP LIVER SERPL-CCNC: 277 U/L (ref 39–100)
ALT SERPL-CCNC: 79 U/L (ref 14–54)
ANION GAP SERPL CALC-SCNC: 5 MMOL/L (ref 0–18)
AST SERPL-CCNC: 56 U/L (ref 15–41)
BILIRUB SERPL-MCNC: 0.5 MG/DL (ref 0.1–2)
BILIRUB UR QL STRIP.AUTO: NEGATIVE
BUN BLD-MCNC: 12 MG/DL (ref 8–20)
BUN/CREAT SERPL: 14.1 (ref 10–20)
CALCIUM BLD-MCNC: 10.1 MG/DL (ref 8.3–10.3)
CHLORIDE SERPL-SCNC: 107 MMOL/L (ref 101–111)
CO2 SERPL-SCNC: 25 MMOL/L (ref 22–32)
CREAT BLD-MCNC: 0.85 MG/DL (ref 0.55–1.02)
DEPRECATED HBV CORE AB SER IA-ACNC: 12 NG/ML (ref 12–240)
ERYTHROCYTE [DISTWIDTH] IN BLOOD BY AUTOMATED COUNT: 17.6 % (ref 11.5–16)
GLOBULIN PLAS-MCNC: 4.9 G/DL (ref 2.5–3.7)
GLUCOSE BLD-MCNC: 100 MG/DL (ref 70–99)
GLUCOSE UR STRIP.AUTO-MCNC: NEGATIVE MG/DL
HCT VFR BLD AUTO: 44.8 % (ref 34–50)
HGB BLD-MCNC: 14.6 G/DL (ref 12–16)
IRON SATURATION: 11 % (ref 15–50)
IRON: 61 UG/DL (ref 28–170)
KETONES UR STRIP.AUTO-MCNC: NEGATIVE MG/DL
LEUKOCYTE ESTERASE UR QL STRIP.AUTO: NEGATIVE
M PROTEIN MFR SERPL ELPH: 8.4 G/DL (ref 6.1–8.3)
MCH RBC QN AUTO: 25.2 PG (ref 27–33.2)
MCHC RBC AUTO-ENTMCNC: 32.6 G/DL (ref 31–37)
MCV RBC AUTO: 77.4 FL (ref 81–100)
NITRITE UR QL STRIP.AUTO: NEGATIVE
OSMOLALITY SERPL CALC.SUM OF ELEC: 284 MOSM/KG (ref 275–295)
PH UR STRIP.AUTO: 5 [PH] (ref 4.5–8)
PLATELET # BLD AUTO: 249 10(3)UL (ref 150–450)
POTASSIUM SERPL-SCNC: 4.1 MMOL/L (ref 3.6–5.1)
PROT UR STRIP.AUTO-MCNC: NEGATIVE MG/DL
RBC # BLD AUTO: 5.79 X10(6)UL (ref 3.8–5.1)
RBC UR QL AUTO: NEGATIVE
RED CELL DISTRIBUTION WIDTH-SD: 46.3 FL (ref 35.1–46.3)
SODIUM SERPL-SCNC: 137 MMOL/L (ref 136–144)
SP GR UR STRIP.AUTO: 1.02 (ref 1–1.03)
TOTAL IRON BINDING CAPACITY: 569 UG/DL (ref 240–450)
TRANSFERRIN SERPL-MCNC: 382 MG/DL (ref 200–360)
UROBILINOGEN UR STRIP.AUTO-MCNC: <2 MG/DL
WBC # BLD AUTO: 12 X10(3) UL (ref 4–13)

## 2018-07-24 NOTE — TELEPHONE ENCOUNTER
----- Message from Sergio Neves DO sent at 7/24/2018  7:36 AM CDT -----  OVerall, LFTs are improved from previous. Still has some microcytosis (small cells) but no anemia. I added on iron labs. UA unremarkable. I will send in tegretol to start.  Needs fo

## 2019-02-05 ENCOUNTER — TELEPHONE (OUTPATIENT)
Dept: FAMILY MEDICINE CLINIC | Facility: CLINIC | Age: 49
End: 2019-02-05

## 2019-02-05 DIAGNOSIS — Z01.419 WELL WOMAN EXAM: Primary | ICD-10-CM

## 2019-02-05 DIAGNOSIS — Z13.29 SCREENING FOR THYROID DISORDER: ICD-10-CM

## 2019-02-05 DIAGNOSIS — Z13.0 SCREENING FOR BLOOD DISEASE: ICD-10-CM

## 2019-02-05 DIAGNOSIS — Z13.220 SCREENING, LIPID: ICD-10-CM

## 2019-02-05 DIAGNOSIS — Z13.228 SCREENING FOR METABOLIC DISORDER: ICD-10-CM

## 2019-02-05 DIAGNOSIS — Z13.21 ENCOUNTER FOR VITAMIN DEFICIENCY SCREENING: ICD-10-CM

## 2019-02-05 NOTE — TELEPHONE ENCOUNTER
Please enter lab orders for the patient's upcoming physical appointment. Physical scheduled: Your appointments     Date & Time Appointment Department [facility])    Feb 11, 2019  1:45 PM CST Physical - Established Patient with Ana Velez, [address]  ([address]  [address])        [address] [facility],  [address]  Nilda Daniels [phone]         Preferred lab: [facility]     The patient has been notified to complete fasting labs prior to their physical appointment.

## 2019-02-11 ENCOUNTER — OFFICE VISIT (OUTPATIENT)
Dept: FAMILY MEDICINE CLINIC | Facility: CLINIC | Age: 49
End: 2019-02-11
Payer: COMMERCIAL

## 2019-02-11 VITALS
TEMPERATURE: 98 F | SYSTOLIC BLOOD PRESSURE: 132 MMHG | DIASTOLIC BLOOD PRESSURE: 82 MMHG | WEIGHT: 213 LBS | RESPIRATION RATE: 18 BRPM | HEIGHT: 61 IN | BODY MASS INDEX: 40.22 KG/M2 | HEART RATE: 80 BPM

## 2019-02-11 DIAGNOSIS — E55.9 VITAMIN D DEFICIENCY: ICD-10-CM

## 2019-02-11 DIAGNOSIS — K74.3 PRIMARY BILIARY CIRRHOSIS (HCC): ICD-10-CM

## 2019-02-11 DIAGNOSIS — Z12.39 SCREENING BREAST EXAMINATION: ICD-10-CM

## 2019-02-11 DIAGNOSIS — D24.1 FIBROADENOMA OF RIGHT BREAST: ICD-10-CM

## 2019-02-11 DIAGNOSIS — Z11.1 SCREENING FOR TUBERCULOSIS: ICD-10-CM

## 2019-02-11 DIAGNOSIS — Z00.00 PREVENTATIVE HEALTH CARE: Primary | ICD-10-CM

## 2019-02-11 LAB
ALBUMIN/GLOBULIN RATIO: 1.2 (CALC) (ref 1–2.5)
ALBUMIN: 4.1 G/DL (ref 3.6–5.1)
ALKALINE PHOSPHATASE: 295 U/L (ref 33–115)
ALT: 86 U/L (ref 6–29)
AST: 76 U/L (ref 10–35)
BILIRUBIN, TOTAL: 0.7 MG/DL (ref 0.2–1.2)
BUN: 13 MG/DL (ref 7–25)
CALCIUM: 9.8 MG/DL (ref 8.6–10.2)
CARBON DIOXIDE: 25 MMOL/L (ref 20–32)
CHLORIDE: 106 MMOL/L (ref 98–110)
CHOL/HDLC RATIO: 3 (CALC)
CHOLESTEROL, TOTAL: 234 MG/DL
CREATININE: 0.77 MG/DL (ref 0.5–1.1)
EGFR IF AFRICN AM: 107 ML/MIN/1.73M2
EGFR IF NONAFRICN AM: 93 ML/MIN/1.73M2
GLOBULIN: 3.5 G/DL (CALC) (ref 1.9–3.7)
GLUCOSE: 86 MG/DL (ref 65–99)
HDL CHOLESTEROL: 77 MG/DL
HEMATOCRIT: 44.8 % (ref 35–45)
HEMOGLOBIN: 14.8 G/DL (ref 11.7–15.5)
LDL-CHOLESTEROL: 137 MG/DL (CALC)
MCH: 27.1 PG (ref 27–33)
MCHC: 33 G/DL (ref 32–36)
MCV: 82.1 FL (ref 80–100)
MPV: 11.2 FL (ref 7.5–12.5)
NON-HDL CHOLESTEROL: 157 MG/DL (CALC)
PLATELET COUNT: 291 THOUSAND/UL (ref 140–400)
POTASSIUM: 4.4 MMOL/L (ref 3.5–5.3)
PROTEIN, TOTAL: 7.6 G/DL (ref 6.1–8.1)
RDW: 14.3 % (ref 11–15)
RED BLOOD CELL COUNT: 5.46 MILLION/UL (ref 3.8–5.1)
SODIUM: 138 MMOL/L (ref 135–146)
TRIGLYCERIDES: 101 MG/DL
TSH W/REFLEX TO FT4: 1.92 MIU/L
VITAMIN D, 25-OH, TOTAL: 18 NG/ML (ref 30–100)
WHITE BLOOD CELL COUNT: 4.7 THOUSAND/UL (ref 3.8–10.8)

## 2019-02-11 PROCEDURE — 86580 TB INTRADERMAL TEST: CPT | Performed by: PHYSICIAN ASSISTANT

## 2019-02-11 PROCEDURE — 99396 PREV VISIT EST AGE 40-64: CPT | Performed by: PHYSICIAN ASSISTANT

## 2019-02-11 RX ORDER — ERGOCALCIFEROL 1.25 MG/1
50000 CAPSULE ORAL WEEKLY
Qty: 4 CAPSULE | Refills: 0 | Status: SHIPPED | OUTPATIENT
Start: 2019-02-11 | End: 2019-03-13

## 2019-02-12 PROBLEM — Z87.42 HX OF MENORRHAGIA: Status: RESOLVED | Noted: 2017-06-06 | Resolved: 2019-02-12

## 2019-02-12 PROBLEM — Z01.419 WELL WOMAN EXAM WITH ROUTINE GYNECOLOGICAL EXAM: Status: RESOLVED | Noted: 2017-06-06 | Resolved: 2019-02-12

## 2019-02-14 ENCOUNTER — TELEPHONE (OUTPATIENT)
Dept: FAMILY MEDICINE CLINIC | Facility: CLINIC | Age: 49
End: 2019-02-14

## 2019-02-14 NOTE — TELEPHONE ENCOUNTER
Carlos Alberto Olson attempted to call patient at 4 pm today about her Mantoux (tuberculosis skin test). She is now over the 72 hour read. Unfortunately, she will need to have another tuberculin test placed and will need to have it read in 48-72 hours.  OK to put in new n

## 2019-02-15 NOTE — TELEPHONE ENCOUNTER
Patient came in to the office and requested her forms back.  Patient stated she will be going to [facility] for TB test.  Forms given to patient

## 2019-02-18 ENCOUNTER — OFFICE VISIT (OUTPATIENT)
Dept: FAMILY MEDICINE CLINIC | Facility: CLINIC | Age: 49
End: 2019-02-18
Payer: COMMERCIAL

## 2019-02-18 DIAGNOSIS — Z11.1 SCREENING FOR TUBERCULOSIS: Primary | ICD-10-CM

## 2019-02-18 PROCEDURE — 86580 TB INTRADERMAL TEST: CPT | Performed by: NURSE PRACTITIONER

## 2019-02-18 NOTE — PROGRESS NOTES
Sarath Neff 57year old female       [address]    · Live vaccines in the past month? no  · Any steroid medication in the past month? no  · History of BCG vaccine? no  · If female, are you currently pregnant?   no    · Are

## 2019-02-18 NOTE — PROGRESS NOTES
TB questionnaire reviewed; No contraindications to test.  Pt tolerated placement well. Aware of need to return in 48-72 hours for reading.

## 2019-02-18 NOTE — PATIENT INSTRUCTIONS
You will need to return to clinic in 48-72 hours to have results of TB test read. Please return to clinic on 02/20/2019 between 3:35pm to 7:30pm or on 02/21/2019 between 8 am to 3:35pm to have your TB test read.

## 2019-02-20 ENCOUNTER — OFFICE VISIT (OUTPATIENT)
Dept: FAMILY MEDICINE CLINIC | Facility: CLINIC | Age: 49
End: 2019-02-20
Payer: COMMERCIAL

## 2019-02-20 DIAGNOSIS — Z11.1 ENCOUNTER FOR PPD SKIN TEST READING: Primary | ICD-10-CM

## 2019-04-05 ENCOUNTER — TELEPHONE (OUTPATIENT)
Dept: FAMILY MEDICINE CLINIC | Facility: CLINIC | Age: 49
End: 2019-04-05

## 2019-04-05 NOTE — TELEPHONE ENCOUNTER
Patient received bill for her Vitamin D not being covered by insurance. She needs the code changed. Please call patient's  with details.

## 2019-04-16 NOTE — TELEPHONE ENCOUNTER
LM for pt that the Vitamin D has been resubmitted using Vitamin D Deficiency dx code E55.9. Pt will receive a new EOB.

## 2019-05-05 ENCOUNTER — HOSPITAL ENCOUNTER (OUTPATIENT)
Age: 49
Discharge: HOME | End: 2019-05-05
Payer: COMMERCIAL

## 2019-05-05 VITALS
SYSTOLIC BLOOD PRESSURE: 148 MMHG | RESPIRATION RATE: 18 BRPM | DIASTOLIC BLOOD PRESSURE: 100 MMHG | TEMPERATURE: 99 F | HEART RATE: 91 BPM | OXYGEN SATURATION: 98 %

## 2019-05-05 DIAGNOSIS — T78.40XA ALLERGIC REACTION, INITIAL ENCOUNTER: Primary | ICD-10-CM

## 2019-05-05 PROCEDURE — 99213 OFFICE O/P EST LOW 20 MIN: CPT

## 2019-05-05 PROCEDURE — 99214 OFFICE O/P EST MOD 30 MIN: CPT

## 2019-05-05 RX ORDER — PREDNISONE 20 MG/1
TABLET ORAL
Qty: 18 TABLET | Refills: 0 | Status: SHIPPED | OUTPATIENT
Start: 2019-05-05 | End: 2021-03-09 | Stop reason: ALTCHOICE

## 2019-05-05 RX ORDER — MELATONIN
1000 DAILY
COMMUNITY

## 2019-05-05 RX ORDER — FAMOTIDINE 20 MG/1
20 TABLET ORAL ONCE
Status: COMPLETED | OUTPATIENT
Start: 2019-05-05 | End: 2019-05-05

## 2019-05-05 NOTE — ED PROVIDER NOTES
Patient Seen in: [address] [facility]    History   Patient presents with:  Rash Skin Problem (integumentary)    Stated Complaint: HIVES ARMS/STOMACH/LEGS X 3 DAYS    HPI  Patient is a 47-year-old female with past medical history of biliar except as noted above.     Physical Exam     ED Triage Vitals [05/05/19 0944]   BP (!) 144/102   Pulse 91   Resp 18   Temp 98.5 °F (36.9 °C)   Temp src Temporal   SpO2 98 %   O2 Device None (Room air)       Current:BP (!) 148/100   Pulse 91   Temp 98.5 °F ( am    Follow-up:  Myranda Daley MD  [address] [address] [place] [address] [phone]      if symptoms persist or worsen        Medications Prescribed:  Discharge Medication List as of 5/5/2019 10:15 AM    START taking these medications    pred

## 2019-05-05 NOTE — ED INITIAL ASSESSMENT (HPI)
Pt has a rash/hives to trunk, arms, and upper thighs.   No SOB no swelling to face lips tongue/face, feels a little itchy, but no other symptoms

## 2019-06-24 ENCOUNTER — TELEPHONE (OUTPATIENT)
Dept: FAMILY MEDICINE CLINIC | Facility: CLINIC | Age: 49
End: 2019-06-24

## 2019-06-24 NOTE — TELEPHONE ENCOUNTER
Not on protocol LOV 2/11/2019 with my Pisano PA-c no HX of Sumatriptan ever being prescribed for her from this office

## 2019-06-24 NOTE — TELEPHONE ENCOUNTER
Pt requesting Sumatriptan for Migraine. Was informed to call pharmacy but since she has not refilled for a while was told to call us.

## 2019-06-25 RX ORDER — SUMATRIPTAN 50 MG/1
TABLET, FILM COATED ORAL
Qty: 9 TABLET | Refills: 0 | Status: SHIPPED | OUTPATIENT
Start: 2019-06-25 | End: 2021-03-23

## 2020-05-15 ENCOUNTER — VIRTUAL PHONE E/M (OUTPATIENT)
Dept: FAMILY MEDICINE CLINIC | Facility: CLINIC | Age: 50
End: 2020-05-15
Payer: COMMERCIAL

## 2020-05-15 DIAGNOSIS — K13.0 ANGULAR CHEILITIS: Primary | ICD-10-CM

## 2020-05-15 PROCEDURE — 99213 OFFICE O/P EST LOW 20 MIN: CPT | Performed by: PHYSICIAN ASSISTANT

## 2020-05-15 NOTE — PROGRESS NOTES
Virtual Telephone Check-In    Carlton De La Cruz verbally consents to a Virtual/Telephone Check-In visit on 05/15/20. Patient understands and accepts financial responsibility for any deductible, co-insurance and/or co-pays associated with this service.

## 2020-06-01 ENCOUNTER — TELEPHONE (OUTPATIENT)
Dept: FAMILY MEDICINE CLINIC | Facility: CLINIC | Age: 50
End: 2020-06-01

## 2020-06-01 NOTE — TELEPHONE ENCOUNTER
Patient called states her eyes have been swelling off and on, she wants to know if should come in for an appointment to see Mi Wylie, she believes they might be allergies, please advise.

## 2020-06-01 NOTE — TELEPHONE ENCOUNTER
Yes that sounds like a good plan. Have her come in on Thursday or Friday if still present and drops not helping.  Let us know sooner if worsening

## 2021-02-08 ENCOUNTER — TELEPHONE (OUTPATIENT)
Dept: FAMILY MEDICINE CLINIC | Facility: CLINIC | Age: 51
End: 2021-02-08

## 2021-02-08 DIAGNOSIS — Z12.31 ENCOUNTER FOR SCREENING MAMMOGRAM FOR BREAST CANCER: Primary | ICD-10-CM

## 2021-02-08 NOTE — TELEPHONE ENCOUNTER
LOV  5/15/2020 was virtual.Last Px was 2/11/2019. Pt is overdue for Px. Please assist pt in scheduling Px. Labs will be ordered after Px is scheduled. Routed to front staff.

## 2021-02-08 NOTE — TELEPHONE ENCOUNTER
Pt requesting orders for fasting labs and a mammogram to be sent to [facility]. Pt requesting a call back once orders are placed.

## 2021-02-09 NOTE — TELEPHONE ENCOUNTER
Called pt advised on message below.  Pt schedule 03/23/21 with Dr. Dora Gagnon @ 1:00pm. Pt is requesting for orders to be sent so that she can schedule for mammogram.

## 2021-02-16 ENCOUNTER — TELEPHONE (OUTPATIENT)
Dept: FAMILY MEDICINE CLINIC | Facility: CLINIC | Age: 51
End: 2021-02-16

## 2021-02-16 NOTE — TELEPHONE ENCOUNTER
Patient has her physical scheduled with Dr. Laurita Sutton 21 and is needing a letter stating that and then faxed to her employer at [phone] Attn: Tae Stokes.  Patient works in  and her physical  this month and they are willing to exte

## 2021-03-09 ENCOUNTER — HOSPITAL ENCOUNTER (OUTPATIENT)
Age: 51
Discharge: HOME | End: 2021-03-09
Payer: COMMERCIAL

## 2021-03-09 VITALS
DIASTOLIC BLOOD PRESSURE: 100 MMHG | HEART RATE: 87 BPM | OXYGEN SATURATION: 98 % | RESPIRATION RATE: 18 BRPM | SYSTOLIC BLOOD PRESSURE: 154 MMHG | TEMPERATURE: 99 F

## 2021-03-09 DIAGNOSIS — R03.0 ELEVATED BLOOD PRESSURE READING WITHOUT DIAGNOSIS OF HYPERTENSION: Primary | ICD-10-CM

## 2021-03-09 DIAGNOSIS — T14.8XXA BONE BRUISE: ICD-10-CM

## 2021-03-09 PROCEDURE — 99213 OFFICE O/P EST LOW 20 MIN: CPT

## 2021-03-09 PROCEDURE — 99212 OFFICE O/P EST SF 10 MIN: CPT

## 2021-03-09 RX ORDER — ARNICA MONTANA 1 [HP_X]/G
1 GEL TOPICAL 2 TIMES DAILY PRN
Qty: 1 TUBE | Refills: 0 | Status: SHIPPED | OUTPATIENT
Start: 2021-03-09

## 2021-03-10 NOTE — ED INITIAL ASSESSMENT (HPI)
C/o right upper leg bruising started a week ago after falling and hit to a metal bar at . Denies short of breath. Denies hitting head.

## 2021-03-10 NOTE — ED PROVIDER NOTES
Patient Seen in: [facility]      History   Patient presents with:  Bruising    Stated Complaint: rigth leg large bruise x1 week    HPI/Subjective:   HPI    77-year-old female here with complaint of a large bruise to her right thigh x1 wee Alcohol use: Yes      Alcohol/week: 0.0 standard drinks      Comment: 0-1 drink per month    Drug use: No             Review of Systems    Positive for stated complaint: rigth leg large bruise x1 week  Other systems are as noted in HPI.   Constitutional and normal.         Behavior: Behavior normal.         Thought Content:  Thought content normal.         Judgment: Judgment normal.             ED Course                   MDM     Clinical Impression: bone bruise/contusion/elevated blood pressure without diagno

## 2021-03-17 ENCOUNTER — TELEPHONE (OUTPATIENT)
Dept: FAMILY MEDICINE CLINIC | Facility: CLINIC | Age: 51
End: 2021-03-17

## 2021-03-17 DIAGNOSIS — Z00.00 ROUTINE GENERAL MEDICAL EXAMINATION AT A HEALTH CARE FACILITY: Primary | ICD-10-CM

## 2021-03-17 NOTE — TELEPHONE ENCOUNTER
Please enter lab orders for the patient's upcoming physical appointment. Physical scheduled:    Your appointments     Date & Time Appointment Department [facility])    Mar 23, 2021  1:00 PM CDT Physical - Established with Georges Schmidt  [facility]

## 2021-03-23 ENCOUNTER — HOSPITAL ENCOUNTER (OUTPATIENT)
Dept: MAMMOGRAPHY | Facility: HOSPITAL | Age: 51
Discharge: HOME | End: 2021-03-23
Attending: FAMILY MEDICINE
Payer: COMMERCIAL

## 2021-03-23 ENCOUNTER — OFFICE VISIT (OUTPATIENT)
Dept: FAMILY MEDICINE CLINIC | Facility: CLINIC | Age: 51
End: 2021-03-23
Payer: COMMERCIAL

## 2021-03-23 VITALS
WEIGHT: 187 LBS | BODY MASS INDEX: 35.3 KG/M2 | DIASTOLIC BLOOD PRESSURE: 78 MMHG | HEART RATE: 72 BPM | TEMPERATURE: 98 F | RESPIRATION RATE: 18 BRPM | SYSTOLIC BLOOD PRESSURE: 146 MMHG | HEIGHT: 61 IN

## 2021-03-23 DIAGNOSIS — E55.9 VITAMIN D DEFICIENCY: ICD-10-CM

## 2021-03-23 DIAGNOSIS — Z12.31 ENCOUNTER FOR SCREENING MAMMOGRAM FOR BREAST CANCER: ICD-10-CM

## 2021-03-23 DIAGNOSIS — Z00.00 ROUTINE GENERAL MEDICAL EXAMINATION AT A HEALTH CARE FACILITY: Primary | ICD-10-CM

## 2021-03-23 PROCEDURE — 3077F SYST BP >= 140 MM HG: CPT | Performed by: FAMILY MEDICINE

## 2021-03-23 PROCEDURE — 3078F DIAST BP <80 MM HG: CPT | Performed by: FAMILY MEDICINE

## 2021-03-23 PROCEDURE — 77067 SCR MAMMO BI INCL CAD: CPT | Performed by: FAMILY MEDICINE

## 2021-03-23 PROCEDURE — 3008F BODY MASS INDEX DOCD: CPT | Performed by: FAMILY MEDICINE

## 2021-03-23 PROCEDURE — 99396 PREV VISIT EST AGE 40-64: CPT | Performed by: FAMILY MEDICINE

## 2021-03-23 PROCEDURE — 77063 BREAST TOMOSYNTHESIS BI: CPT | Performed by: FAMILY MEDICINE

## 2021-03-23 RX ORDER — SUMATRIPTAN 50 MG/1
TABLET, FILM COATED ORAL
Qty: 9 TABLET | Refills: 2 | Status: SHIPPED | OUTPATIENT
Start: 2021-03-23

## 2021-03-23 NOTE — PROGRESS NOTES
HPI:   Eleazar Armenta is a 52year old female who presents for a complete physical exam.   Last pap:  S/p hyst 2017  Last mammogram:  3/2021  Previous colonoscopy:  n/a  Family hx of breast, ovarian, cervical or colon CA:  no  Immunizations:  Tdap:  /, Fl Contact dermatitis and other eczema, due to unspecified cause    • Esophageal reflux    • Excessive or frequent menstruation    • Migraine, unspecified, without mention of intractable migraine without mention of status migrainosus    • Primary biliary cirr suspicious lesions  HEENT: atraumatic, normocephalic, TMs normal  EYES:PERRLA, EOMI,conjunctiva are clear  NECK: supple,no adenopathy  BREAST:/  LUNGS: clear to auscultation  CARDIO: RRR without murmur  GI: good BS's,no masses, HSM or tenderness  :deferr

## 2021-03-24 LAB
ALBUMIN/GLOBULIN RATIO: 1.2 (CALC) (ref 1–2.5)
ALBUMIN: 3.9 G/DL (ref 3.6–5.1)
ALKALINE PHOSPHATASE: 312 U/L (ref 31–125)
ALT: 98 U/L (ref 6–29)
AST: 75 U/L (ref 10–35)
BILIRUBIN, TOTAL: 0.7 MG/DL (ref 0.2–1.2)
BUN: 14 MG/DL (ref 7–25)
CALCIUM: 10.1 MG/DL (ref 8.6–10.2)
CARBON DIOXIDE: 25 MMOL/L (ref 20–32)
CHLORIDE: 103 MMOL/L (ref 98–110)
CHOL/HDLC RATIO: 2.8 (CALC)
CHOLESTEROL, TOTAL: 199 MG/DL
CREATININE: 0.79 MG/DL (ref 0.5–1.1)
EGFR IF AFRICN AM: 103 ML/MIN/1.73M2
EGFR IF NONAFRICN AM: 89 ML/MIN/1.73M2
GLOBULIN: 3.2 G/DL (CALC) (ref 1.9–3.7)
GLUCOSE: 139 MG/DL (ref 65–99)
HDL CHOLESTEROL: 70 MG/DL
HEMATOCRIT: 44.5 % (ref 35–45)
HEMOGLOBIN: 14.9 G/DL (ref 11.7–15.5)
LDL-CHOLESTEROL: 108 MG/DL (CALC)
MCH: 27.7 PG (ref 27–33)
MCHC: 33.5 G/DL (ref 32–36)
MCV: 82.9 FL (ref 80–100)
MPV: 10.9 FL (ref 7.5–12.5)
NON-HDL CHOLESTEROL: 129 MG/DL (CALC)
PLATELET COUNT: 276 THOUSAND/UL (ref 140–400)
POTASSIUM: 4.2 MMOL/L (ref 3.5–5.3)
PROTEIN, TOTAL: 7.1 G/DL (ref 6.1–8.1)
RDW: 13.3 % (ref 11–15)
RED BLOOD CELL COUNT: 5.37 MILLION/UL (ref 3.8–5.1)
SODIUM: 135 MMOL/L (ref 135–146)
TRIGLYCERIDES: 110 MG/DL
TSH W/REFLEX TO FT4: 0.96 MIU/L
WHITE BLOOD CELL COUNT: 4.8 THOUSAND/UL (ref 3.8–10.8)

## 2021-03-26 ENCOUNTER — TELEPHONE (OUTPATIENT)
Dept: FAMILY MEDICINE CLINIC | Facility: CLINIC | Age: 51
End: 2021-03-26

## 2021-03-26 DIAGNOSIS — E55.9 VITAMIN D DEFICIENCY: Primary | ICD-10-CM

## 2021-03-26 NOTE — TELEPHONE ENCOUNTER
Pt states did not get the Vit D level done because was not covered under general lab code. Pt has Vit D deficiency. New ordered placed with new code.

## 2021-05-07 ENCOUNTER — HOSPITAL ENCOUNTER (OUTPATIENT)
Dept: ULTRASOUND IMAGING | Age: 51
Discharge: HOME | End: 2021-05-07
Attending: FAMILY MEDICINE
Payer: COMMERCIAL

## 2021-05-07 ENCOUNTER — HOSPITAL ENCOUNTER (OUTPATIENT)
Dept: MAMMOGRAPHY | Age: 51
Discharge: HOME | End: 2021-05-07
Attending: FAMILY MEDICINE
Payer: COMMERCIAL

## 2021-05-07 DIAGNOSIS — R92.2 INCONCLUSIVE MAMMOGRAM: ICD-10-CM

## 2021-05-07 PROCEDURE — 77062 BREAST TOMOSYNTHESIS BI: CPT | Performed by: FAMILY MEDICINE

## 2021-05-07 PROCEDURE — 76641 ULTRASOUND BREAST COMPLETE: CPT | Performed by: FAMILY MEDICINE

## 2021-05-07 PROCEDURE — 77066 DX MAMMO INCL CAD BI: CPT | Performed by: FAMILY MEDICINE

## 2022-06-16 ENCOUNTER — APPOINTMENT (OUTPATIENT)
Dept: GENERAL RADIOLOGY | Facility: HOSPITAL | Age: 52
End: 2022-06-16
Attending: EMERGENCY MEDICINE
Payer: COMMERCIAL

## 2022-06-16 ENCOUNTER — APPOINTMENT (OUTPATIENT)
Dept: ULTRASOUND IMAGING | Facility: HOSPITAL | Age: 52
End: 2022-06-16
Attending: EMERGENCY MEDICINE
Payer: COMMERCIAL

## 2022-06-16 ENCOUNTER — HOSPITAL ENCOUNTER (EMERGENCY)
Facility: HOSPITAL | Age: 52
Discharge: HOME | End: 2022-06-16
Attending: EMERGENCY MEDICINE
Payer: COMMERCIAL

## 2022-06-16 VITALS
HEART RATE: 86 BPM | BODY MASS INDEX: 36 KG/M2 | RESPIRATION RATE: 20 BRPM | TEMPERATURE: 98 F | WEIGHT: 190 LBS | DIASTOLIC BLOOD PRESSURE: 100 MMHG | SYSTOLIC BLOOD PRESSURE: 150 MMHG | OXYGEN SATURATION: 96 %

## 2022-06-16 DIAGNOSIS — M70.62 TROCHANTERIC BURSITIS OF LEFT HIP: Primary | ICD-10-CM

## 2022-06-16 LAB
ALBUMIN SERPL-MCNC: 3.5 G/DL (ref 3.4–5)
ALBUMIN/GLOB SERPL: 0.9 {RATIO} (ref 1–2)
ALP LIVER SERPL-CCNC: 254 U/L
ALT SERPL-CCNC: 73 U/L
ANION GAP SERPL CALC-SCNC: 5 MMOL/L (ref 0–18)
AST SERPL-CCNC: 51 U/L (ref 15–37)
BASOPHILS # BLD AUTO: 0.05 X10(3) UL (ref 0–0.2)
BASOPHILS NFR BLD AUTO: 0.5 %
BILIRUB SERPL-MCNC: 0.8 MG/DL (ref 0.1–2)
BUN BLD-MCNC: 14 MG/DL (ref 7–18)
CALCIUM BLD-MCNC: 9.3 MG/DL (ref 8.5–10.1)
CHLORIDE SERPL-SCNC: 105 MMOL/L (ref 98–112)
CK SERPL-CCNC: 51 U/L
CO2 SERPL-SCNC: 24 MMOL/L (ref 21–32)
CREAT BLD-MCNC: 0.83 MG/DL
EOSINOPHIL # BLD AUTO: 0.05 X10(3) UL (ref 0–0.7)
EOSINOPHIL NFR BLD AUTO: 0.5 %
ERYTHROCYTE [DISTWIDTH] IN BLOOD BY AUTOMATED COUNT: 14.1 %
GLOBULIN PLAS-MCNC: 4 G/DL (ref 2.8–4.4)
GLUCOSE BLD-MCNC: 87 MG/DL (ref 70–99)
HCT VFR BLD AUTO: 43 %
HGB BLD-MCNC: 14.4 G/DL
IMM GRANULOCYTES # BLD AUTO: 0.03 X10(3) UL (ref 0–1)
IMM GRANULOCYTES NFR BLD: 0.3 %
LYMPHOCYTES # BLD AUTO: 1.15 X10(3) UL (ref 1–4)
LYMPHOCYTES NFR BLD AUTO: 12.5 %
MCH RBC QN AUTO: 28.4 PG (ref 26–34)
MCHC RBC AUTO-ENTMCNC: 33.5 G/DL (ref 31–37)
MCV RBC AUTO: 84.8 FL
MONOCYTES # BLD AUTO: 0.72 X10(3) UL (ref 0.1–1)
MONOCYTES NFR BLD AUTO: 7.8 %
NEUTROPHILS # BLD AUTO: 7.23 X10 (3) UL (ref 1.5–7.7)
NEUTROPHILS # BLD AUTO: 7.23 X10(3) UL (ref 1.5–7.7)
NEUTROPHILS NFR BLD AUTO: 78.4 %
OSMOLALITY SERPL CALC.SUM OF ELEC: 278 MOSM/KG (ref 275–295)
PLATELET # BLD AUTO: 263 10(3)UL (ref 150–450)
POTASSIUM SERPL-SCNC: 4 MMOL/L (ref 3.5–5.1)
PROT SERPL-MCNC: 7.5 G/DL (ref 6.4–8.2)
RBC # BLD AUTO: 5.07 X10(6)UL
SODIUM SERPL-SCNC: 134 MMOL/L (ref 136–145)
WBC # BLD AUTO: 9.2 X10(3) UL (ref 4–11)

## 2022-06-16 PROCEDURE — 73502 X-RAY EXAM HIP UNI 2-3 VIEWS: CPT | Performed by: EMERGENCY MEDICINE

## 2022-06-16 PROCEDURE — 96375 TX/PRO/DX INJ NEW DRUG ADDON: CPT

## 2022-06-16 PROCEDURE — 96374 THER/PROPH/DIAG INJ IV PUSH: CPT

## 2022-06-16 PROCEDURE — 96361 HYDRATE IV INFUSION ADD-ON: CPT

## 2022-06-16 PROCEDURE — 80053 COMPREHEN METABOLIC PANEL: CPT | Performed by: EMERGENCY MEDICINE

## 2022-06-16 PROCEDURE — 85025 COMPLETE CBC W/AUTO DIFF WBC: CPT | Performed by: EMERGENCY MEDICINE

## 2022-06-16 PROCEDURE — 99285 EMERGENCY DEPT VISIT HI MDM: CPT

## 2022-06-16 PROCEDURE — 93971 EXTREMITY STUDY: CPT | Performed by: EMERGENCY MEDICINE

## 2022-06-16 PROCEDURE — 99284 EMERGENCY DEPT VISIT MOD MDM: CPT

## 2022-06-16 PROCEDURE — 82550 ASSAY OF CK (CPK): CPT | Performed by: EMERGENCY MEDICINE

## 2022-06-16 RX ORDER — DEXAMETHASONE SODIUM PHOSPHATE 10 MG/ML
10 INJECTION, SOLUTION INTRAMUSCULAR; INTRAVENOUS ONCE
Status: COMPLETED | OUTPATIENT
Start: 2022-06-16 | End: 2022-06-16

## 2022-06-16 RX ORDER — RIBOFLAVIN (VITAMIN B2) 100 MG
100 TABLET ORAL DAILY
COMMUNITY

## 2022-06-16 RX ORDER — MORPHINE SULFATE 4 MG/ML
6 INJECTION, SOLUTION INTRAMUSCULAR; INTRAVENOUS ONCE
Status: COMPLETED | OUTPATIENT
Start: 2022-06-16 | End: 2022-06-16

## 2022-06-16 RX ORDER — BIOTIN 1 MG
1 TABLET ORAL DAILY
COMMUNITY

## 2022-06-16 RX ORDER — HYDROCODONE BITARTRATE AND ACETAMINOPHEN 5; 325 MG/1; MG/1
1 TABLET ORAL EVERY 8 HOURS PRN
Qty: 10 TABLET | Refills: 0 | Status: SHIPPED | OUTPATIENT
Start: 2022-06-16 | End: 2022-06-21

## 2022-06-16 RX ORDER — KETOROLAC TROMETHAMINE 30 MG/ML
15 INJECTION, SOLUTION INTRAMUSCULAR; INTRAVENOUS ONCE
Status: COMPLETED | OUTPATIENT
Start: 2022-06-16 | End: 2022-06-16

## 2022-06-16 RX ORDER — PREDNISONE 20 MG/1
40 TABLET ORAL DAILY
Qty: 8 TABLET | Refills: 0 | Status: SHIPPED | OUTPATIENT
Start: 2022-06-17 | End: 2022-06-21

## 2022-06-16 RX ORDER — DIAZEPAM 5 MG/ML
5 INJECTION, SOLUTION INTRAMUSCULAR; INTRAVENOUS ONCE
Status: COMPLETED | OUTPATIENT
Start: 2022-06-16 | End: 2022-06-16

## 2022-06-16 RX ORDER — DIAZEPAM 5 MG/1
5 TABLET ORAL EVERY 12 HOURS PRN
Qty: 14 TABLET | Refills: 0 | Status: SHIPPED | OUTPATIENT
Start: 2022-06-16 | End: 2022-06-23

## 2022-06-23 ENCOUNTER — TELEPHONE (OUTPATIENT)
Dept: FAMILY MEDICINE CLINIC | Facility: CLINIC | Age: 52
End: 2022-06-23

## 2022-06-23 NOTE — TELEPHONE ENCOUNTER
Pt calling to request refill on Ulysses. Pt was recently seen in ER for bursitis. Was put on Norco, prednisone and diazepam. Pt has completed a round of the anti-inflammatory  and is down to one of the muscle relaxer and pain med. Pt would like a refill on Ulysses until she see orthopedic surgeon on Tuesday June 28, 2022. Pt had to return to work and is in a lot of pain when she gets home. Please send refill to [facility] on [address].

## 2022-06-23 NOTE — TELEPHONE ENCOUNTER
It looks like they diagnosed with trochanteric bursitis. Bursitis is not treated with managed with pain medications (they do not fix the problem). After steroid pack, transition to anti-inflammatories, ice, gentle stretches.

## 2022-06-23 NOTE — TELEPHONE ENCOUNTER
Called LMOM in detail that Xiomara CEE suggested she apply ice with gentle stretching of hip.  And suggested ibuprofen 600 mg with 2 extra strength tylenol, for the pain

## 2023-01-26 ENCOUNTER — ORDER TRANSCRIPTION (OUTPATIENT)
Dept: ADMINISTRATIVE | Facility: HOSPITAL | Age: 53
End: 2023-01-26

## 2023-01-26 DIAGNOSIS — Z12.31 ENCOUNTER FOR SCREENING MAMMOGRAM FOR MALIGNANT NEOPLASM OF BREAST: Primary | ICD-10-CM

## 2023-01-31 ENCOUNTER — TELEPHONE (OUTPATIENT)
Dept: FAMILY MEDICINE CLINIC | Facility: CLINIC | Age: 53
End: 2023-01-31

## 2023-01-31 ENCOUNTER — HOSPITAL ENCOUNTER (OUTPATIENT)
Dept: MAMMOGRAPHY | Facility: HOSPITAL | Age: 53
Discharge: HOME | End: 2023-01-31
Attending: FAMILY MEDICINE
Payer: COMMERCIAL

## 2023-01-31 DIAGNOSIS — Z12.31 ENCOUNTER FOR SCREENING MAMMOGRAM FOR BREAST CANCER: Primary | ICD-10-CM

## 2023-01-31 DIAGNOSIS — Z12.31 ENCOUNTER FOR SCREENING MAMMOGRAM FOR BREAST CANCER: ICD-10-CM

## 2023-01-31 PROCEDURE — 77063 BREAST TOMOSYNTHESIS BI: CPT | Performed by: FAMILY MEDICINE

## 2023-01-31 PROCEDURE — 77067 SCR MAMMO BI INCL CAD: CPT | Performed by: FAMILY MEDICINE

## 2023-01-31 NOTE — TELEPHONE ENCOUNTER
Patient is requesting an order for her annual screening mammogram.    Patient has been notified to allow 2-3 business days for order placement. Reviewed with patient that she may schedule mammogram via [facility] or by calling Central Scheduling at that time. Request for mammogram order routed to triage.

## 2023-02-08 ENCOUNTER — TELEPHONE (OUTPATIENT)
Dept: FAMILY MEDICINE CLINIC | Facility: CLINIC | Age: 53
End: 2023-02-08

## 2023-02-08 DIAGNOSIS — Z13.6 SCREENING FOR CARDIOVASCULAR CONDITION: Primary | ICD-10-CM

## 2023-02-08 NOTE — TELEPHONE ENCOUNTER
Please enter lab orders for the patient's upcoming physical appointment. Physical scheduled: Your appointments     Date & Time Appointment Department [facility])    Mar 01, 2023  5:00 PM CST Physical - Established with Vicki Hammond MD [address], [address] [place] [address], [place] ([facility])            [place] Rami[place] [address] [phone]         Preferred lab: [facility]     The patient has been notified to complete fasting labs prior to their physical appointment.

## 2023-02-08 NOTE — TELEPHONE ENCOUNTER
LOV 3/23/21 with Dr. Cj Braga. Upcoming 3/1/23 with Dr. Scottie Alvarado. GI referral to wait until appointment?

## 2023-03-01 ENCOUNTER — TELEPHONE (OUTPATIENT)
Dept: FAMILY MEDICINE CLINIC | Facility: CLINIC | Age: 53
End: 2023-03-01

## 2023-03-01 ENCOUNTER — OFFICE VISIT (OUTPATIENT)
Dept: FAMILY MEDICINE CLINIC | Facility: CLINIC | Age: 53
End: 2023-03-01
Payer: COMMERCIAL

## 2023-03-01 VITALS
SYSTOLIC BLOOD PRESSURE: 140 MMHG | WEIGHT: 207 LBS | DIASTOLIC BLOOD PRESSURE: 88 MMHG | BODY MASS INDEX: 39.08 KG/M2 | OXYGEN SATURATION: 97 % | HEIGHT: 61 IN | HEART RATE: 77 BPM

## 2023-03-01 DIAGNOSIS — G43.809 OTHER MIGRAINE WITHOUT STATUS MIGRAINOSUS, NOT INTRACTABLE: ICD-10-CM

## 2023-03-01 DIAGNOSIS — Z80.9 FAMILY HISTORY OF CANCER: Primary | ICD-10-CM

## 2023-03-01 DIAGNOSIS — K74.3 PRIMARY BILIARY CIRRHOSIS (HCC): ICD-10-CM

## 2023-03-01 DIAGNOSIS — Z12.11 SCREEN FOR COLON CANCER: ICD-10-CM

## 2023-03-01 PROCEDURE — 3008F BODY MASS INDEX DOCD: CPT | Performed by: STUDENT IN AN ORGANIZED HEALTH CARE EDUCATION/TRAINING PROGRAM

## 2023-03-01 PROCEDURE — 99213 OFFICE O/P EST LOW 20 MIN: CPT | Performed by: STUDENT IN AN ORGANIZED HEALTH CARE EDUCATION/TRAINING PROGRAM

## 2023-03-01 PROCEDURE — 3079F DIAST BP 80-89 MM HG: CPT | Performed by: STUDENT IN AN ORGANIZED HEALTH CARE EDUCATION/TRAINING PROGRAM

## 2023-03-01 PROCEDURE — 3077F SYST BP >= 140 MM HG: CPT | Performed by: STUDENT IN AN ORGANIZED HEALTH CARE EDUCATION/TRAINING PROGRAM

## 2023-03-01 PROCEDURE — 99396 PREV VISIT EST AGE 40-64: CPT | Performed by: STUDENT IN AN ORGANIZED HEALTH CARE EDUCATION/TRAINING PROGRAM

## 2023-03-01 RX ORDER — SUMATRIPTAN 50 MG/1
TABLET, FILM COATED ORAL
Qty: 9 TABLET | Refills: 2 | Status: SHIPPED | OUTPATIENT
Start: 2023-03-01

## 2023-03-01 RX ORDER — ONDANSETRON 4 MG/1
4 TABLET, FILM COATED ORAL EVERY 8 HOURS PRN
Qty: 20 TABLET | Refills: 0 | Status: SHIPPED | OUTPATIENT
Start: 2023-03-01

## 2023-03-01 NOTE — TELEPHONE ENCOUNTER
Landy did give a physical form to Dr Onofre Pope she needs to return for a TB test  She stated she will call and schedule .  Dr Devonte Mercado @this time has the form

## 2023-03-14 ENCOUNTER — TELEPHONE (OUTPATIENT)
Dept: FAMILY MEDICINE CLINIC | Facility: CLINIC | Age: 53
End: 2023-03-14

## 2023-03-14 NOTE — TELEPHONE ENCOUNTER
Claude Roa had an appointment with  3/1/2023. A Medical Report on an Adult in a [facility] [facility] form for [facility] was given to . A TB test is needed to complete the form. LMOM for Landy to call and make an appointment for a nurse visit to receive a TB test. Form is in 's InBox.

## 2023-03-15 NOTE — TELEPHONE ENCOUNTER
Called LMOM to call back she needs a nurse visit to get a TB test done for the physical form to be done.

## 2023-03-16 NOTE — TELEPHONE ENCOUNTER
Patient called and stated she does not need a TB test for this job form will be placed in Dr Drew Rodriguez in box

## 2023-03-18 NOTE — TELEPHONE ENCOUNTER
Called patient notifying her that form has been completed and ready for .  Placed in Draw copies have made and sent to scan

## 2024-07-22 ENCOUNTER — TELEPHONE (OUTPATIENT)
Dept: FAMILY MEDICINE CLINIC | Facility: CLINIC | Age: 54
End: 2024-07-22

## 2024-07-22 DIAGNOSIS — Z13.0 SCREENING FOR BLOOD DISEASE: Primary | ICD-10-CM

## 2024-07-22 DIAGNOSIS — Z13.220 SCREENING FOR LIPID DISORDERS: ICD-10-CM

## 2024-07-22 DIAGNOSIS — Z13.228 SCREENING FOR METABOLIC DISORDER: ICD-10-CM

## 2024-07-22 DIAGNOSIS — Z12.31 ENCOUNTER FOR SCREENING MAMMOGRAM FOR MALIGNANT NEOPLASM OF BREAST: ICD-10-CM

## 2024-07-22 DIAGNOSIS — Z13.29 SCREENING FOR THYROID DISORDER: ICD-10-CM

## 2024-07-22 NOTE — TELEPHONE ENCOUNTER
Please enter lab orders for the patient's upcoming physical appointment.     Physical scheduled:   Your appointments       Date & Time Appointment Department ([facility])    Aug 12, 2024 3:00 PM CDT Physical - Established with Marc Stokes NP [facility] ([facility])              [facility]  [address] [place] [id number]  [phone]           Preferred lab: [facility]     The patient has been notified to complete fasting labs prior to their physical appointment.

## 2024-08-07 LAB
ABSOLUTE BASOPHILS: 50 CELLS/UL (ref 0–200)
ABSOLUTE EOSINOPHILS: 220 CELLS/UL (ref 15–500)
ABSOLUTE LYMPHOCYTES: 1435 CELLS/UL (ref 850–3900)
ABSOLUTE MONOCYTES: 480 CELLS/UL (ref 200–950)
ABSOLUTE NEUTROPHILS: 2815 CELLS/UL (ref 1500–7800)
ALBUMIN/GLOBULIN RATIO: 1.2 (CALC) (ref 1–2.5)
ALBUMIN: 3.9 G/DL (ref 3.6–5.1)
ALKALINE PHOSPHATASE: 187 U/L (ref 37–153)
ALT: 49 U/L (ref 6–29)
AST: 44 U/L (ref 10–35)
BASOPHILS: 1 %
BILIRUBIN, TOTAL: 0.8 MG/DL (ref 0.2–1.2)
BUN: 17 MG/DL (ref 7–25)
CALCIUM: 9.7 MG/DL (ref 8.6–10.4)
CARBON DIOXIDE: 24 MMOL/L (ref 20–32)
CHLORIDE: 104 MMOL/L (ref 98–110)
CHOL/HDLC RATIO: 3.6 (CALC)
CHOLESTEROL, TOTAL: 228 MG/DL
CREATININE: 0.8 MG/DL (ref 0.5–1.03)
EGFR: 89 ML/MIN/1.73M2
EOSINOPHILS: 4.4 %
GLOBULIN: 3.3 G/DL (CALC) (ref 1.9–3.7)
GLUCOSE: 82 MG/DL (ref 65–99)
HDL CHOLESTEROL: 63 MG/DL
HEMATOCRIT: 44.7 % (ref 35–45)
HEMOGLOBIN: 14.8 G/DL (ref 11.7–15.5)
LDL-CHOLESTEROL: 137 MG/DL (CALC)
LYMPHOCYTES: 28.7 %
MCH: 28.5 PG (ref 27–33)
MCHC: 33.1 G/DL (ref 32–36)
MCV: 86.1 FL (ref 80–100)
MONOCYTES: 9.6 %
MPV: 11.7 FL (ref 7.5–12.5)
NEUTROPHILS: 56.3 %
NON-HDL CHOLESTEROL: 165 MG/DL (CALC)
PLATELET COUNT: 241 THOUSAND/UL (ref 140–400)
POTASSIUM: 4.4 MMOL/L (ref 3.5–5.3)
PROTEIN, TOTAL: 7.2 G/DL (ref 6.1–8.1)
RDW: 13.4 % (ref 11–15)
RED BLOOD CELL COUNT: 5.19 MILLION/UL (ref 3.8–5.1)
SODIUM: 136 MMOL/L (ref 135–146)
TRIGLYCERIDES: 150 MG/DL
TSH W/REFLEX TO FT4: 1.16 MIU/L
WHITE BLOOD CELL COUNT: 5 THOUSAND/UL (ref 3.8–10.8)

## 2024-08-12 ENCOUNTER — OFFICE VISIT (OUTPATIENT)
Dept: FAMILY MEDICINE CLINIC | Facility: CLINIC | Age: 54
End: 2024-08-12
Payer: COMMERCIAL

## 2024-08-12 VITALS
TEMPERATURE: 97 F | OXYGEN SATURATION: 98 % | SYSTOLIC BLOOD PRESSURE: 136 MMHG | HEART RATE: 80 BPM | HEIGHT: 61.61 IN | WEIGHT: 220.81 LBS | BODY MASS INDEX: 41.16 KG/M2 | RESPIRATION RATE: 16 BRPM | DIASTOLIC BLOOD PRESSURE: 86 MMHG

## 2024-08-12 DIAGNOSIS — E78.5 DYSLIPIDEMIA: ICD-10-CM

## 2024-08-12 DIAGNOSIS — G43.809 OTHER MIGRAINE WITHOUT STATUS MIGRAINOSUS, NOT INTRACTABLE: ICD-10-CM

## 2024-08-12 DIAGNOSIS — K74.3 PRIMARY BILIARY CIRRHOSIS (HCC): ICD-10-CM

## 2024-08-12 DIAGNOSIS — E66.01 CLASS 3 SEVERE OBESITY DUE TO EXCESS CALORIES WITH SERIOUS COMORBIDITY AND BODY MASS INDEX (BMI) OF 40.0 TO 44.9 IN ADULT (HCC): ICD-10-CM

## 2024-08-12 DIAGNOSIS — Z00.00 ROUTINE PHYSICAL EXAMINATION: Primary | ICD-10-CM

## 2024-08-12 DIAGNOSIS — Z12.11 SCREENING FOR COLON CANCER: ICD-10-CM

## 2024-08-12 DIAGNOSIS — R00.2 PALPITATIONS: ICD-10-CM

## 2024-08-12 RX ORDER — SUMATRIPTAN 50 MG/1
TABLET, FILM COATED ORAL
Qty: 9 TABLET | Refills: 3 | Status: SHIPPED | OUTPATIENT
Start: 2024-08-12

## 2024-08-12 NOTE — PROGRESS NOTES
Landy Gibbs is a 54 year old female who presents for a complete physical exam:       Patient complains of approx 6 month history of intermittent palpitations (although, none since quitting job 3 weeks ago).  Reports symptoms would occur randomly but never with activity. Reports symptoms would last approx 1-2 minutes and then self resolve. Is concerned as her father  from MI in Sept (age 81) but her brother also had an Mi in 2023 and he is in his 50's. Denies chest pain, SOB, NOYOLA, headaches, dizziness, lightheadedness, visual changes.     Has prior diagnosis of primary biliary cirrhosis, has followed with hepatologist for some time but is no longer seeing them as LFT's have remained stable for some time. Noted with dyslipidemia on screening labs.     Reports she is struggling to manage weight, even though she eats a healthy diet and is exercising with walking twice daily. Reports has tried weight watchers in the past and done boot camps for exercise. All w/o weight loss.    Has prior diagnosis of migraines. Well controlled with PRN sumatriptan. Needs refill.     Is not longer working, did work at [employer] but recently quit.  retiring soon, so is not sure she will go back to work at this time. Is  with 3 grown children. Feels she eats an overall healthy diet. Does not exercise routinely. Reports is a non-smoker, drinks 0 alcoholic drinks weekly.     Menstrual Cycle: s/p hysterectomy.   Pap: s/p hysterectomy.   Mammogram: ordered, no scheduled.   Colon cancer screening: ordered as below.     Wt Readings from Last 6 Encounters:   24 220 lb 12.8 oz (100.2 kg)   23 207 lb (93.9 kg)   22 190 lb (86.2 kg)   21 187 lb (84.8 kg)   19 213 lb (96.6 kg)   18 211 lb (95.7 kg)       CHOLESTEROL, TOTAL (mg/dL)   Date Value   2024 228 (H)   2021 199   2019 234 (H)     HDL CHOLESTEROL (mg/dL)   Date Value   2024 63   2021 70    02/09/2019 77     LDL-CHOLESTEROL (mg/dL (calc))   Date Value   08/06/2024 137 (H)   03/23/2021 108 (H)   02/09/2019 137 (H)     AST (U/L)   Date Value   08/06/2024 44 (H)   06/16/2022 51 (H)   03/23/2021 75 (H)   02/09/2019 76 (H)     ALT (U/L)   Date Value   08/06/2024 49 (H)   06/16/2022 73 (H)   03/23/2021 98 (H)   02/09/2019 86 (H)        Current Outpatient Medications   Medication Sig Dispense Refill    SUMAtriptan 50 MG Oral Tab 1 tablet at onset of migraine, repeat in 2 hours if needed 9 tablet 3    Calcium Carbonate (CALCIUM 500 OR) Take by mouth.      ondansetron (ZOFRAN) 4 mg tablet Take 1 tablet (4 mg total) by mouth every 8 (eight) hours as needed for Nausea. 20 tablet 0    Ascorbic Acid (VITAMIN C) 1000 MG Oral Tab Take 1 tablet (1,000 mg total) by mouth daily.      Cholecalciferol (VITAMIN D3) 25 MCG (1000 UT) Oral Cap Take 3 tablets by mouth daily.        Past Medical History:    Acute pharyngitis    Biliary cirrhosis (HCC)    Contact dermatitis and other eczema, due to unspecified cause    Esophageal reflux    Excessive or frequent menstruation    Migraine, unspecified, without mention of intractable migraine without mention of status migrainosus    Primary biliary cirrhosis (HCC)    Sprain of neck      Past Surgical History:   Procedure Laterality Date    Appendectomy      9/2016    Hysterectomy  06/15/2017    right ovary present    Oophorectomy      left only removed    Other surgical history  6/8/2011    liver biopsy    Tonsillectomy  2/2003      Family History   Problem Relation Age of Onset    No Known Problems Mother     No Known Problems Brother     Other (bone cancer) Maternal Grandmother     No Known Problems Brother     No Known Problems Brother     Other (brain cancer) Maternal Aunt       Social History     Socioeconomic History    Marital status:    Occupational History    Occupation:       Employer: [email or web address]   Tobacco Use    Smoking status: Never    Smokeless  tobacco: Never   Vaping Use    Vaping status: Never Used   Substance and Sexual Activity    Alcohol use: Yes     Alcohol/week: 0.0 standard drinks of alcohol     Comment: 0-1 drink per month    Drug use: No   Other Topics Concern    Caffeine Concern Yes     Comment: 6oz daily    Exercise Yes     Comment: 3 d/wk    Seat Belt Yes    Self-Exams Yes     Comment: self breast exam once a week     Social History: as above     Health Maintenance  Immunizations: Recommend flu vaccine for 4520-0304 flu season.       Immunization History   Administered Date(s) Administered    Covid-19 Vaccine Pfizer 30 mcg/0.3 ml 12/27/2021    HEP A 08/22/2011    HEP B 08/22/2011, 10/14/2011    MMR 11/27/2015, 12/29/2015    TDAP 11/27/2015, 12/29/2015    Tb Intradermal Test 09/08/2014, 09/27/2016, 02/11/2019, 02/18/2019     Dental Visits: Regularly.       REVIEW OF SYSTEMS:   GENERAL: feels well otherwise. No fever, chills, malaise  SKIN: denies any unusual skin lesions, rash or pruritis  EYES: denies blurred/double vision, light sensitivity or visual disturbances  HEENT: denies nasal congestion, sinus pain/tenderness. Denies neck stiffness.  BREAST: denies pain, dimpling, nipple discharge  LUNGS: denies dyspnea, wheezing, SOB, NOYOLA, cough  CARDIOVASCULAR: denies chest pain on exertion, palpitations, edema, orthopnea  GI: denies abdominal pain, heartburn, diarrhea or constipation  : denies dysuria, frequency, urgency, hematuria, vaginal discharge or itching.  MUSCULOSKELETAL: denies back pain  NEURO: denies headaches, dizziness, syncope    EXAM:   /86   Pulse 80   Temp 96.9 °F (36.1 °C)   Resp 16   Ht 5' 1.61\" (1.565 m)   Wt 220 lb 12.8 oz (100.2 kg)   LMP 05/30/2017 (Exact Date)   SpO2 98%   BMI 40.89 kg/m²   Body mass index is 40.89 kg/m².   GENERAL: well developed, well nourished,in no apparent distress  SKIN: Skin warm/dry. Color appropriate for ethnicity. No rashes, suspicious lesions.  HEENT: atraumatic, normocephalic,  ears are clear.  EYES: PERRLA, EOMI. Conjunctiva are clear. Sclera white  NECK: supple w/o masses/tenderness/ adenopathy, no bruits/JVD, Thyroid symmetrical w/o  enlargement  CHEST: no chest tenderness over ribs or bony prominences.   BREAST: deferred  LUNGS: clear to auscultation bilaterally. Symmetrical expansion during respirations.  CARDIO: RRR without murmurs  GI: Soft, non-tender/non-distended, BS(+)x4, no masses, HSM or CVA tenderness.  MUSCULOSKELETAL: muscle strength grade 5 to all extremities, back non-tender.   EXTREMITIES: no edema, full ROM to all extremities. Patellar DTR 2+ bilat  NEURO: A/Ox3, cranial nerves II-XII intact, motor/sensory function grossly intact.     ASSESSMENT AND PLAN:      HEALTH MAINTENANCE:      Encounter Diagnoses   Name Primary?    Routine physical examination- adult female with concerns as below. Discussed appropriate health guidance including importance of continuing daily exercise-adding strength training and healthy diet choices. Labs reviewed in office today.   Yes    Palpitations- suspect this was stress related as it has resolved since quitting work. Will check holter test.        Primary biliary cirrhosis (HCC)- stable, management per specialists if needed.        Dyslipidemia- will monitor for now. This is often seen in biliary cirrhosis without expected increase in cardiac risk. Patient could consider CT calcium scoring.        Class 3 severe obesity due to excess calories with serious comorbidity and body mass index (BMI) of 40.0 to 44.9 in adult (HCC)- referred to weight loss clinic.        Other migraine without status migrainosus, not intractable- Stable. Continue current management, refills provided.        Screening for colon cancer- referred to Dr. Aden for testing.         No orders of the defined types were placed in this encounter.      Meds & Refills for this Visit:  Requested Prescriptions     Signed Prescriptions Disp Refills    SUMAtriptan 50 MG  Oral Tab 9 tablet 3     Si tablet at onset of migraine, repeat in 2 hours if needed       Imaging & Consults:  GASTRO - INTERNAL  OP REFERRAL TO WEIGHT LOSS CLINIC  CARD MONITOR HOLTER 48 HOUR (CPT=93225)    No follow-ups on file.  There are no Patient Instructions on file for this visit.

## 2024-08-13 ENCOUNTER — HOSPITAL ENCOUNTER (OUTPATIENT)
Dept: MAMMOGRAPHY | Facility: HOSPITAL | Age: 54
Discharge: HOME | End: 2024-08-13
Attending: NURSE PRACTITIONER
Payer: COMMERCIAL

## 2024-08-13 DIAGNOSIS — Z12.31 ENCOUNTER FOR SCREENING MAMMOGRAM FOR MALIGNANT NEOPLASM OF BREAST: ICD-10-CM

## 2024-08-13 PROCEDURE — 77067 SCR MAMMO BI INCL CAD: CPT | Performed by: NURSE PRACTITIONER

## 2024-08-13 PROCEDURE — 77063 BREAST TOMOSYNTHESIS BI: CPT | Performed by: NURSE PRACTITIONER

## 2024-08-16 ENCOUNTER — HOSPITAL ENCOUNTER (OUTPATIENT)
Dept: CV DIAGNOSTICS | Age: 54
Discharge: HOME | End: 2024-08-16
Attending: NURSE PRACTITIONER
Payer: COMMERCIAL

## 2024-08-16 DIAGNOSIS — R00.2 PALPITATIONS: ICD-10-CM

## 2024-08-16 PROCEDURE — 93227 XTRNL ECG REC<48 HR R&I: CPT | Performed by: NURSE PRACTITIONER

## 2024-08-16 PROCEDURE — 93226 XTRNL ECG REC<48 HR SCAN A/R: CPT | Performed by: NURSE PRACTITIONER

## 2024-08-16 PROCEDURE — 93225 XTRNL ECG REC<48 HRS REC: CPT | Performed by: NURSE PRACTITIONER

## 2024-09-06 ENCOUNTER — TELEPHONE (OUTPATIENT)
Dept: FAMILY MEDICINE CLINIC | Facility: CLINIC | Age: 54
End: 2024-09-06

## 2024-09-25 PROBLEM — D12.5 BENIGN NEOPLASM OF SIGMOID COLON: Status: ACTIVE | Noted: 2024-09-25

## 2024-09-25 PROBLEM — Z12.11 SPECIAL SCREENING FOR MALIGNANT NEOPLASM OF COLON: Status: ACTIVE | Noted: 2024-09-25

## 2024-10-24 ENCOUNTER — OFFICE VISIT (OUTPATIENT)
Dept: FAMILY MEDICINE CLINIC | Facility: CLINIC | Age: 54
End: 2024-10-24
Payer: COMMERCIAL

## 2024-10-24 VITALS
SYSTOLIC BLOOD PRESSURE: 146 MMHG | RESPIRATION RATE: 16 BRPM | DIASTOLIC BLOOD PRESSURE: 102 MMHG | HEART RATE: 84 BPM | HEIGHT: 61.25 IN | TEMPERATURE: 97 F | BODY MASS INDEX: 41.41 KG/M2 | WEIGHT: 222.19 LBS

## 2024-10-24 DIAGNOSIS — K74.3 PRIMARY BILIARY CIRRHOSIS (HCC): Primary | ICD-10-CM

## 2024-10-24 DIAGNOSIS — I10 ESSENTIAL HYPERTENSION: ICD-10-CM

## 2024-10-24 DIAGNOSIS — Z02.89 ENCOUNTER FOR OCCUPATIONAL HEALTH EXAMINATION: ICD-10-CM

## 2024-10-24 PROCEDURE — 99214 OFFICE O/P EST MOD 30 MIN: CPT | Performed by: PHYSICIAN ASSISTANT

## 2024-10-24 RX ORDER — LOSARTAN POTASSIUM 25 MG/1
25 TABLET ORAL DAILY
Qty: 30 TABLET | Refills: 0 | Status: SHIPPED | OUTPATIENT
Start: 2024-10-24

## 2024-10-29 NOTE — PROGRESS NOTES
Chief Complaint   Patient presents with    Employment Physical     Needs form completed    Blood Pressure     164/108 and 146/102        HISTORY OF PRESENT ILLNESS  Landy Gibbs is a 54 year old female who presents for evaluation of few concerns.    - Chronic PBC: has not been seen by hematologist in years. No updated liver ultrasound.   - Elevated BP on multiple readings. Not on meds.   - Needs updated work form. Not sure if she needs new TB testing.      Current Outpatient Medications:     Omega-3 Fatty Acids (FISH OIL) 600 MG Oral Cap, Take 1 capsule by mouth daily., Disp: , Rfl:     losartan 25 MG Oral Tab, Take 1 tablet (25 mg total) by mouth daily., Disp: 30 tablet, Rfl: 0    SUMAtriptan 50 MG Oral Tab, 1 tablet at onset of migraine, repeat in 2 hours if needed, Disp: 9 tablet, Rfl: 3    Calcium Carbonate (CALCIUM 500 OR), Take by mouth., Disp: , Rfl:     ondansetron (ZOFRAN) 4 mg tablet, Take 1 tablet (4 mg total) by mouth every 8 (eight) hours as needed for Nausea., Disp: 20 tablet, Rfl: 0    Ascorbic Acid (VITAMIN C) 1000 MG Oral Tab, Take 1 tablet (1,000 mg total) by mouth daily., Disp: , Rfl:     Cholecalciferol (VITAMIN D3) 25 MCG (1000 UT) Oral Cap, Take 3 tablets by mouth daily., Disp: , Rfl:     Allergies: Bee, Vicodin [hydrocodone-acetaminophen], and Acetaminophen-codeine    Patient Active Problem List   Diagnosis    Trigeminal neuralgia    Primary biliary cirrhosis (HCC)    Vitamin D deficiency    Cyst of ovary    S/P BENITEZ (total abdominal hysterectomy)    Special screening for malignant neoplasm of colon    Benign neoplasm of sigmoid colon       Past Surgical History:   Procedure Laterality Date    Appendectomy      9/2016    Colonoscopy      Hysterectomy  06/15/2017    right ovary present    Needle biopsy liver      Oophorectomy      left only removed    Other surgical history  06/08/2011    liver biopsy    Tonsillectomy  02/2003    Total abdom hysterectomy         Social History      Socioeconomic History    Marital status:    Occupational History    Occupation:       Employer: [employer]   Tobacco Use    Smoking status: Never    Smokeless tobacco: Never   Vaping Use    Vaping status: Never Used   Substance and Sexual Activity    Alcohol use: Not Currently     Comment: 0-1 drink per month    Drug use: No   Other Topics Concern    Caffeine Concern Yes     Comment: 2 servings a week    Exercise Yes     Comment: walks daily    Seat Belt Yes    Self-Exams Yes     Comment: self breast exam once a week     Vitals:    10/24/24 1446 10/24/24 1500   BP: (!) 164/108 (!) 146/102   Pulse: 84    Resp: 16    Temp: 97.1 °F (36.2 °C)    TempSrc: Oral    Weight: 222 lb 3.2 oz (100.8 kg)    Height: 5' 1.25\" (1.556 m)        PHYSICAL EXAM  GENERAL:  Alert and in no acute distress.  Well groomed and appropriately dressed.  CARDIOVASCULAR: Regular rate and rhythm.  PULMONOLOGY: Normal effort on room air. Clear to auscultation bilaterally.  ABDOMEN: Soft, nontender, nondistended.  No hepatosplenomegaly. No CVA tenderness.  PULSES 2+ radial pulses bilaterally.    Labs:   No visits with results within 1 Week(s) from this visit.   Latest known visit with results is:   Telephone on 07/22/2024   Component Date Value Ref Range Status    WHITE BLOOD CELL COUNT 08/06/2024 5.0  3.8 - 10.8 Thousand/uL Final    RED BLOOD CELL COUNT 08/06/2024 5.19 (H)  3.80 - 5.10 Million/uL Final    HEMOGLOBIN 08/06/2024 14.8  11.7 - 15.5 g/dL Final    HEMATOCRIT 08/06/2024 44.7  35.0 - 45.0 % Final    MCV 08/06/2024 86.1  80.0 - 100.0 fL Final    MCH 08/06/2024 28.5  27.0 - 33.0 pg Final    MCHC 08/06/2024 33.1  32.0 - 36.0 g/dL Final    RDW 08/06/2024 13.4  11.0 - 15.0 % Final    PLATELET COUNT 08/06/2024 241  140 - 400 Thousand/uL Final    MPV 08/06/2024 11.7  7.5 - 12.5 fL Final    ABSOLUTE NEUTROPHILS 08/06/2024 2,815  1,500 - 7,800 cells/uL Final    ABSOLUTE LYMPHOCYTES 08/06/2024 1,435  850 - 3,900 cells/uL Final     ABSOLUTE MONOCYTES 08/06/2024 480  200 - 950 cells/uL Final    ABSOLUTE EOSINOPHILS 08/06/2024 220  15 - 500 cells/uL Final    ABSOLUTE BASOPHILS 08/06/2024 50  0 - 200 cells/uL Final    NEUTROPHILS 08/06/2024 56.3  % Final    LYMPHOCYTES 08/06/2024 28.7  % Final    MONOCYTES 08/06/2024 9.6  % Final    EOSINOPHILS 08/06/2024 4.4  % Final    BASOPHILS 08/06/2024 1.0  % Final    GLUCOSE 08/06/2024 82  65 - 99 mg/dL Final    UREA NITROGEN (BUN) 08/06/2024 17  7 - 25 mg/dL Final    CREATININE 08/06/2024 0.80  0.50 - 1.03 mg/dL Final    EGFR 08/06/2024 89  > OR = 60 mL/min/1.73m2 Final    BUN/CREATININE RATIO 08/06/2024 SEE NOTE:  6 - 22 (calc) Final    SODIUM 08/06/2024 136  135 - 146 mmol/L Final    POTASSIUM 08/06/2024 4.4  3.5 - 5.3 mmol/L Final    CHLORIDE 08/06/2024 104  98 - 110 mmol/L Final    CARBON DIOXIDE 08/06/2024 24  20 - 32 mmol/L Final    CALCIUM 08/06/2024 9.7  8.6 - 10.4 mg/dL Final    PROTEIN, TOTAL 08/06/2024 7.2  6.1 - 8.1 g/dL Final    ALBUMIN 08/06/2024 3.9  3.6 - 5.1 g/dL Final    GLOBULIN 08/06/2024 3.3  1.9 - 3.7 g/dL (calc) Final    ALBUMIN/GLOBULIN RATIO 08/06/2024 1.2  1.0 - 2.5 (calc) Final    BILIRUBIN, TOTAL 08/06/2024 0.8  0.2 - 1.2 mg/dL Final    ALKALINE PHOSPHATASE 08/06/2024 187 (H)  37 - 153 U/L Final    AST 08/06/2024 44 (H)  10 - 35 U/L Final    ALT 08/06/2024 49 (H)  6 - 29 U/L Final    CHOLESTEROL, TOTAL 08/06/2024 228 (H)  <200 mg/dL Final    HDL CHOLESTEROL 08/06/2024 63  > OR = 50 mg/dL Final    TRIGLYCERIDES 08/06/2024 150 (H)  <150 mg/dL Final    LDL-CHOLESTEROL 08/06/2024 137 (H)  mg/dL (calc) Final    CHOL/HDLC RATIO 08/06/2024 3.6  <5.0 (calc) Final    NON-HDL CHOLESTEROL 08/06/2024 165 (H)  <130 mg/dL (calc) Final    TSH W/REFLEX TO FT4 08/06/2024 1.16  mIU/L Final       ASSESSMENT/ PLAN  1. Primary biliary cirrhosis (HCC)  Recommend that she keeps up with regular hepatologist follow up.   - Hepatology Referral - Contracted Specialist - [place]  - US LIVER WITH  ELASTOGRAPHY(CPT=76705,14276); Future    2. Essential hypertension  Multiple elevated readings. Recommend losartan 25 mg.   - Comp Metabolic Panel (14) [E]    3. Encounter for occupational health examination  Work form completed. Let us know if mantoux needed.    Patient expresses understanding and agreement with above plan.  Marbin Santos PA-C

## 2025-04-25 ENCOUNTER — APPOINTMENT (OUTPATIENT)
Dept: GENERAL RADIOLOGY | Facility: HOSPITAL | Age: 55
End: 2025-04-25
Attending: EMERGENCY MEDICINE
Payer: COMMERCIAL

## 2025-04-25 ENCOUNTER — APPOINTMENT (OUTPATIENT)
Dept: ULTRASOUND IMAGING | Facility: HOSPITAL | Age: 55
End: 2025-04-25
Attending: EMERGENCY MEDICINE
Payer: COMMERCIAL

## 2025-04-25 ENCOUNTER — HOSPITAL ENCOUNTER (EMERGENCY)
Facility: HOSPITAL | Age: 55
Discharge: HOME | End: 2025-04-25
Attending: EMERGENCY MEDICINE
Payer: COMMERCIAL

## 2025-04-25 VITALS
OXYGEN SATURATION: 100 % | DIASTOLIC BLOOD PRESSURE: 107 MMHG | SYSTOLIC BLOOD PRESSURE: 163 MMHG | RESPIRATION RATE: 16 BRPM | TEMPERATURE: 98 F | HEART RATE: 68 BPM

## 2025-04-25 DIAGNOSIS — I10 HYPERTENSION, UNSPECIFIED TYPE: ICD-10-CM

## 2025-04-25 DIAGNOSIS — M70.61 TROCHANTERIC BURSITIS OF RIGHT HIP: Primary | ICD-10-CM

## 2025-04-25 LAB
ALBUMIN SERPL-MCNC: 4.6 G/DL (ref 3.2–4.8)
ALBUMIN/GLOB SERPL: 1.4 {RATIO} (ref 1–2)
ALP LIVER SERPL-CCNC: 181 U/L (ref 41–108)
ALT SERPL-CCNC: 67 U/L (ref 10–49)
ANION GAP SERPL CALC-SCNC: 10 MMOL/L (ref 0–18)
AST SERPL-CCNC: 58 U/L (ref ?–34)
BASOPHILS # BLD AUTO: 0.05 X10(3) UL (ref 0–0.2)
BASOPHILS NFR BLD AUTO: 0.9 %
BILIRUB SERPL-MCNC: 0.7 MG/DL (ref 0.3–1.2)
BUN BLD-MCNC: 15 MG/DL (ref 9–23)
CALCIUM BLD-MCNC: 10.1 MG/DL (ref 8.7–10.6)
CHLORIDE SERPL-SCNC: 104 MMOL/L (ref 98–112)
CO2 SERPL-SCNC: 25 MMOL/L (ref 21–32)
CREAT BLD-MCNC: 0.89 MG/DL (ref 0.55–1.02)
EGFRCR SERPLBLD CKD-EPI 2021: 78 ML/MIN/1.73M2 (ref 60–?)
EOSINOPHIL # BLD AUTO: 0.22 X10(3) UL (ref 0–0.7)
EOSINOPHIL NFR BLD AUTO: 4.1 %
ERYTHROCYTE [DISTWIDTH] IN BLOOD BY AUTOMATED COUNT: 13.5 %
GLOBULIN PLAS-MCNC: 3.3 G/DL (ref 2–3.5)
GLUCOSE BLD-MCNC: 96 MG/DL (ref 70–99)
HCT VFR BLD AUTO: 44.6 % (ref 35–48)
HGB BLD-MCNC: 15.2 G/DL (ref 12–16)
IMM GRANULOCYTES # BLD AUTO: 0.02 X10(3) UL (ref 0–1)
IMM GRANULOCYTES NFR BLD: 0.4 %
LYMPHOCYTES # BLD AUTO: 1.47 X10(3) UL (ref 1–4)
LYMPHOCYTES NFR BLD AUTO: 27.5 %
MCH RBC QN AUTO: 27.9 PG (ref 26–34)
MCHC RBC AUTO-ENTMCNC: 34.1 G/DL (ref 31–37)
MCV RBC AUTO: 82 FL (ref 80–100)
MONOCYTES # BLD AUTO: 0.51 X10(3) UL (ref 0.1–1)
MONOCYTES NFR BLD AUTO: 9.5 %
NEUTROPHILS # BLD AUTO: 3.08 X10 (3) UL (ref 1.5–7.7)
NEUTROPHILS # BLD AUTO: 3.08 X10(3) UL (ref 1.5–7.7)
NEUTROPHILS NFR BLD AUTO: 57.6 %
OSMOLALITY SERPL CALC.SUM OF ELEC: 289 MOSM/KG (ref 275–295)
PLATELET # BLD AUTO: 269 10(3)UL (ref 150–450)
POTASSIUM SERPL-SCNC: 4.1 MMOL/L (ref 3.5–5.1)
PROT SERPL-MCNC: 7.9 G/DL (ref 5.7–8.2)
RBC # BLD AUTO: 5.44 X10(6)UL (ref 3.8–5.3)
SODIUM SERPL-SCNC: 139 MMOL/L (ref 136–145)
WBC # BLD AUTO: 5.4 X10(3) UL (ref 4–11)

## 2025-04-25 PROCEDURE — 73552 X-RAY EXAM OF FEMUR 2/>: CPT | Performed by: EMERGENCY MEDICINE

## 2025-04-25 PROCEDURE — 80053 COMPREHEN METABOLIC PANEL: CPT | Performed by: EMERGENCY MEDICINE

## 2025-04-25 PROCEDURE — 96374 THER/PROPH/DIAG INJ IV PUSH: CPT

## 2025-04-25 PROCEDURE — 99284 EMERGENCY DEPT VISIT MOD MDM: CPT

## 2025-04-25 PROCEDURE — 85025 COMPLETE CBC W/AUTO DIFF WBC: CPT | Performed by: EMERGENCY MEDICINE

## 2025-04-25 PROCEDURE — 93971 EXTREMITY STUDY: CPT | Performed by: EMERGENCY MEDICINE

## 2025-04-25 PROCEDURE — 73502 X-RAY EXAM HIP UNI 2-3 VIEWS: CPT | Performed by: EMERGENCY MEDICINE

## 2025-04-25 RX ORDER — METHYLPREDNISOLONE 4 MG/1
TABLET ORAL
Qty: 1 EACH | Refills: 0 | Status: SHIPPED | OUTPATIENT
Start: 2025-04-25

## 2025-04-25 RX ORDER — CYCLOBENZAPRINE HCL 10 MG
10 TABLET ORAL 3 TIMES DAILY PRN
Qty: 15 TABLET | Refills: 0 | Status: SHIPPED | OUTPATIENT
Start: 2025-04-25

## 2025-04-25 RX ORDER — LOSARTAN POTASSIUM 50 MG/1
25 TABLET ORAL DAILY
Status: DISCONTINUED | OUTPATIENT
Start: 2025-04-25 | End: 2025-04-25

## 2025-04-25 RX ORDER — KETOROLAC TROMETHAMINE 30 MG/ML
30 INJECTION, SOLUTION INTRAMUSCULAR; INTRAVENOUS ONCE
Status: COMPLETED | OUTPATIENT
Start: 2025-04-25 | End: 2025-04-25

## 2025-04-25 RX ORDER — LOSARTAN POTASSIUM 25 MG/1
25 TABLET ORAL DAILY
Qty: 30 TABLET | Refills: 0 | Status: SHIPPED | OUTPATIENT
Start: 2025-04-25 | End: 2025-05-25

## 2025-04-25 NOTE — ED PROVIDER NOTES
Patient Seen in: [facility] Emergency Department      History     Chief Complaint   Patient presents with    Leg or Foot Injury     Stated Complaint: right leg pain, NKI    Subjective:   HPI      Patient is a 54-year-old female presents the emergency room with a history of right-sided leg pain primarily in her right hip and right thigh which has been ongoing since yesterday.  The patient denies history of any fall or trauma.  The patient has had a history of a trochanteric bursitis in the past and states this feels similar to when she had that previously.  Going to medical records that happened about 3 years ago.  As per review of medical records the patient was seen 2022 for similar symptoms on the left side had a negative workup done at that time including blood work, ultrasound, and x-ray also was found to have elevated blood pressure at that time as well was treated successfully with steroids and muscle relaxants as per my review of medical records.  The patient denies history of any new exercises she is doing.  The patient denies history of any fever.  Patient denies history of any abdominal pain or back pain.  The patient denies weakness in the right lower extremity.  Patient also has elevated blood pressure here in the ER.  Patient states she was started on losartan for elevated blood pressure in the past she then stopped the medication when she \"ran out of the medication\".  Patient states that she had a physical within the last year and told her blood pressure was \"mildly elevated\".  Patient not taking any medication for blood pressure at this time.  History of Present Illness               Objective:     Past Medical History:    Acute pharyngitis    Anemia    Biliary cirrhosis (HCC)    Bursitis    Contact dermatitis and other eczema, due to unspecified cause    Decorative tattoo    Easy bruising    Esophageal reflux    Excessive or frequent menstruation    Headache disorder    Heart palpitations     Heartburn    Irregular bowel habits    Leaking of urine    Migraine, unspecified, without mention of intractable migraine without mention of status migrainosus    Primary biliary cirrhosis (HCC)    Sprain of neck    Weight gain              Past Surgical History:   Procedure Laterality Date    Appendectomy      9/2016    Colonoscopy      Hysterectomy  06/15/2017    right ovary present    Needle biopsy liver      Oophorectomy      left only removed    Other surgical history  06/08/2011    liver biopsy    Tonsillectomy  02/2003    Total abdom hysterectomy                  No pertinent social history.                              Physical Exam     ED Triage Vitals   BP 04/25/25 1211 (!) 185/129   Pulse 04/25/25 1211 87   Resp 04/25/25 1211 17   Temp 04/25/25 1211 97.9 °F (36.6 °C)   Temp src --    SpO2 04/25/25 1211 96 %   O2 Device 04/25/25 1330 None (Room air)       Current Vitals:   Vital Signs  BP: (!) 163/107  Pulse: 68  Resp: 16  Temp: 97.9 °F (36.6 °C)  MAP (mmHg): (!) 125    Oxygen Therapy  SpO2: 100 %  O2 Device: None (Room air)        Physical Exam     Physical Exam         GENERAL: Well-developed, well-nourished female sitting up breathing easily in no apparent distress.  Patient is nontoxic in appearance.  HEENT: Head is normocephalic, atraumatic. Pupils are 4 mm equally round and reactive to light. Oropharynx is clear. Mucous membranes are moist.  LUNGS: Clear to auscultation bilaterally with no wheeze. There is good equal air entry bilaterally.  HEART: Regular rate and rhythm. Normal S1, S2 no S3, or S4. No murmur.  ABDOMEN: There is no focal tenderness to palpation appreciated anywhere throughout the abdomen. There is no guarding, no rebound, no mass, and no organomegaly appreciated. There is normoactive bowel sounds.  EXTREMITIES: There is no cyanosis, clubbing, or edema appreciated. Pulses are 2+ and equal in all 4 extremities.  There is mild focal reproducible tenderness to palpation in the area of  the right lateral hip and right anterior thigh.  There is no overlying ecchymosis appreciated.  There is no overlying erythema appreciated.  There is no pain with passive range of motion at the right hip, right knee, or right ankle.  NEURO: Patient is awake, alert and oriented to time place and person. Motor strength is 5 over 5 in all 4 extremities. There are no gross motor or sensory deficits appreciated.  Patient answering all questions appropriately.        ED Course     Labs Reviewed   CBC WITH DIFFERENTIAL WITH PLATELET - Abnormal; Notable for the following components:       Result Value    RBC 5.44 (*)     All other components within normal limits   COMP METABOLIC PANEL (14) - Abnormal; Notable for the following components:    AST 58 (*)     ALT 67 (*)     Alkaline Phosphatase 181 (*)     All other components within normal limits   RAINBOW DRAW BLUE   RAINBOW DRAW GOLD          Results     I personally reviewed the patient's right femur x-ray images my individual interpretation shows no evidence of any acute fracture or dislocation.  I also reviewed official radiology report which showed results as noted below.      US VENOUS DOPPLER LEG RIGHT - DIAG IMG (CPT=93971)  Result Date: 4/25/2025  CONCLUSION:  No evidence of deep venous thrombosis in the right lower extremity.  LOCATION:  [id number]    Dictated by (CST): Dat Fernandez MD on 4/25/2025 at 4:06 PM     Finalized by (CST): Dat Fernandez MD on 4/25/2025 at 4:07 PM       XR FEMUR MIN 2 VIEWS RIGHT (CPT=73552)  Result Date: 4/25/2025  CONCLUSION:  No evidence of acute displaced fracture or dislocation in the right femur.  LOCATION:  [id number]   Dictated by (CST): Dat Fernandez MD on 4/25/2025 at 2:14 PM     Finalized by (CST): Dat Fernandez MD on 4/25/2025 at 2:14 PM       XR HIP W OR WO PELVIS 2 OR 3 VIEWS, RIGHT (CPT=73502)  Result Date: 4/25/2025  CONCLUSION:  No evidence of acute displaced fracture or dislocation in the right hip.  LOCATION:  [id number]    Dictated by (CST): Dat Fernandez MD on 4/25/2025 at 2:13 PM     Finalized by (CST): Dat Fernandez MD on 4/25/2025 at 2:14 PM                            MDM        14:39 patient sitting back in breathing easily in no apparent distress this time.  Patient is up and ambulatory in the ER with a steady gait.  Patient feeling better on repeat examination.  Will continue to observe at this time.  Patient updated regarding lab results and her elevated liver function test and states that she has a known history of elevated liver function test from previous.  Patient labs today are very similar to what she had back in 2022 as per my review of medical records  16:30 patient sitting back in breathing easily no apparent distress time.  Patient feeling better at this time.  Patient with no other new complaints at this time.  Patient able to ambulate here in the emergency room.  Patient denying crutches for home.  Will discharge to home at this time.  Patient knows to monitor blood pressure closely at home and follow-up with primary care.      Medical Decision Making  Patient an IV line established blood were drawn including a CBC, chemistries, BUN/creatinine, and blood sugar all of which are unremarkable.  Liver function test show mild elevation of liver function test unchanged from patient's previous liver function test from 2022 as per my review of medical records.  Patient underwent x-ray and ultrasound findings as noted above.  Patient given IV pain medication also given her losartan here in the emergency room with some improvement in patient's blood pressure at this time.  The patient had no further new complaints throughout the rest of the emergency room stay.  Patient's pain was feeling better after medication given here in the ER.  The patient's case was discussed at length with the patient and patient's family at the bedside.  Patient is up and ambulatory in the ER with a steady gait and no ataxia.  Patient offered  crutches for home which she is declining.  The patient will be discharged to home at this time she knows to take her losartan as prescribed and monitor blood pressure closely at home and follow-up with primary care physician.  She was treated successfully with steroids and muscle relaxants last time that she had this in 2022 and was given prescription for similar medication today with instructions to have the patient monitor symptoms closely at home return to the ER if any other problems arise.  The patient denied history of any chest pain denies history of any back pain denies history of any abdominal pain.  Patient discharged to home at this time.    Disposition and Plan     Clinical Impression:  1. Trochanteric bursitis of right hip    2. Hypertension, unspecified type         Disposition:  Discharge  4/25/2025  4:33 pm    Follow-up:  Janny Dior MD  [address]  [facility] [id number]  [phone]    Follow up in 2 day(s)  please call to be seen early next week for follow up          Medications Prescribed:  Discharge Medication List as of 4/25/2025  4:49 PM        START taking these medications    Details   methylPREDNISolone (MEDROL) 4 MG Oral Tablet Therapy Pack Dosepack: take as directed, Normal, Disp-1 each, R-0      cyclobenzaprine 10 MG Oral Tab Take 1 tablet (10 mg total) by mouth 3 (three) times daily as needed for Muscle spasms., Normal, Disp-15 tablet, R-0      !! losartan 25 MG Oral Tab Take 1 tablet (25 mg total) by mouth daily., Normal, Disp-30 tablet, R-0       !! - Potential duplicate medications found. Please discuss with provider.          Supplementary Documentation:

## 2025-04-25 NOTE — DISCHARGE INSTRUCTIONS
Rest at home  Monitor your blood pressure closely at home  Return to the ER if symptoms worsen or if any other problems arise

## 2025-04-25 NOTE — ED INITIAL ASSESSMENT (HPI)
PT here with c/o R leg pain that began yesterday. Pt has hx of swelling bursa sac. No known injury

## 2025-06-03 ENCOUNTER — OFFICE VISIT (OUTPATIENT)
Dept: FAMILY MEDICINE CLINIC | Facility: CLINIC | Age: 55
End: 2025-06-03
Payer: COMMERCIAL

## 2025-06-03 VITALS
RESPIRATION RATE: 18 BRPM | HEIGHT: 61 IN | OXYGEN SATURATION: 98 % | SYSTOLIC BLOOD PRESSURE: 152 MMHG | DIASTOLIC BLOOD PRESSURE: 96 MMHG | BODY MASS INDEX: 41.91 KG/M2 | HEART RATE: 97 BPM | WEIGHT: 222 LBS

## 2025-06-03 DIAGNOSIS — I10 ESSENTIAL HYPERTENSION: ICD-10-CM

## 2025-06-03 DIAGNOSIS — M79.651 PAIN OF RIGHT THIGH: Primary | ICD-10-CM

## 2025-06-03 PROCEDURE — 99214 OFFICE O/P EST MOD 30 MIN: CPT | Performed by: NURSE PRACTITIONER

## 2025-06-03 RX ORDER — CYCLOBENZAPRINE HCL 10 MG
10 TABLET ORAL NIGHTLY PRN
Qty: 10 TABLET | Refills: 0 | Status: SHIPPED | OUTPATIENT
Start: 2025-06-03 | End: 2025-06-17

## 2025-06-03 RX ORDER — NAPROXEN 500 MG/1
500 TABLET ORAL 2 TIMES DAILY WITH MEALS
Qty: 10 TABLET | Refills: 0 | Status: SHIPPED | OUTPATIENT
Start: 2025-06-03

## 2025-06-03 RX ORDER — LOSARTAN POTASSIUM 50 MG/1
50 TABLET ORAL DAILY
Qty: 90 TABLET | Refills: 0 | Status: SHIPPED | OUTPATIENT
Start: 2025-06-03

## 2025-06-03 NOTE — PATIENT INSTRUCTIONS
Take Naproxen, one tab twice daily until all tabs are gone. Space doses 12 hours apart for best effect. TAKE DOSES WITH FOOD. Do not take any Advil, Motrin, Aleve or ibuprofen products while taking this medication.      It is ok to take acetaminophen (Tylenol) while taking this medication. If you have regular strength tylenol you may take 3 tabs up to 3 times daily. If you have extra-strength tylenol you may take 2 tabs up to 3 times daily.      Take one cyclobenzaprine tab nightly, before bed, for three (3) nights. Then may take nightly as needed. This medication will likely make you drowsy. Do not drive, operate machinery or make important decisions after taking this medication. Do not drink alcohol while taking this medication.    Apply warm compress or heating pad to back 3-4 times daily for 15-20 minutes each time.     Go to physical therapy as directed.

## 2025-06-03 NOTE — PROGRESS NOTES
Chief Complaint   Patient presents with    ER F/U     Patient here for ER follow up on Trochanteric bursitis of right hip  4/25/25 Guilherme Oneil MD. Still having pain when going up stairs.      HPI:  Presents for follow up of ER visit on 4/25/25 for right hip pain with elevated BP readings while in ER, visit notes and testing reviewed by me. CBC normal save for mildly increased RBC's, CMP's normal save for mildly elevated LFT's. Xrays of hip and femur w/o fracture, dislocation, bony abnormality. US right leg negative for DVT. Was felt to be recurrence of trochanteric bursitis (which patient had approx 3 years ago) managed with Medrol dose pack and cyclobenzaprine for hip pain. Was also instructed to restart her losartan 25mg, which she had not been taking (was prescribed in October 2024 by JULIANO Santos).     In office today reports did have improvement of symptoms while on medication but pain returned to right thigh area once medications completed. Reports pain is worse with stairs, so severe it forces her to go up stairs on hands and knee.  Does have some lowe back pain but this is at her baseline. Denies injury to hip, leg or back. Denies redness or swelling of leg. Has been treating with some ibuprofen since completing medications from ER.   Reports has been taking losartan as ordered as well. Has been checking home BP with no reading lower than 140's/90's. Denies chest pain, palpitations, SOB, NOYOLA, headaches, dizziness, lightheadedness, visual changes.     Past Medical History[1]    Problem List[2]    Current Medications[3]    Physical Exam  BP (!) 152/96   Pulse 97   Resp 18   Ht 5' 1\" (1.549 m)   Wt 222 lb (100.7 kg)   LMP 05/30/2017 (Exact Date)   SpO2 98%   BMI 41.95 kg/m²   Constitutional: well developed, well nourished, in no apparent distress  HEENT: Normocephalic and atraumatic.   Eyes: Conjunctivae are pink and moist without drainage. EOMI.  Neck: Normal range of motion. Neck supple. No JVD.    Cardiovascular: Normal rate, regular rhythm.  No murmur.   Pulmonary/Chest: No respiratory distress. Effort normal. Breath sounds clear bilaterally. No wheezes, rhonchi or rales  MS: Low back is minimally TTP, without masses or obvious deformity. Right thigh is non-TTP w/o edema, masses or deformity. Bilateral SLR (-) to reproduce pain. Right internal/external rotation (+) to reproduce mildly pain to hip/thigh area.    Ext: ROM bilateral legs WNL. Muscle strength bilat legs 5/5. No edema, erythema noted to bilateral legs.   Skin: Skin is warm and dry. No pallor. No rash noted.    A/P:    Encounter Diagnoses   Name Primary?    Pain of right thigh- Naproxen BID for 5 days. Medication administration, use and side effects discussed. Cyclobenzaprine nightly for 3 nights and then nightly PRN. Warned pt about sedation with this medication and advised pt about necessary precautions and activities to avoid. Referred to physical therapy for eval and treat. Also referred to Dr. Knott for eval. Verbalized understanding of instructions and agreeable to this plan of care.     Yes    Essential hypertension- increase losartan to 50mg daily. See me in 3 weeks for BP follow up. Sooner If issues. Verbalized understanding of instructions and agreeable to this plan of care.           No orders of the defined types were placed in this encounter.      Meds & Refills for this Visit:  Requested Prescriptions     Signed Prescriptions Disp Refills    losartan 50 MG Oral Tab 90 tablet 0     Sig: Take 1 tablet (50 mg total) by mouth daily.    cyclobenzaprine 10 MG Oral Tab 10 tablet 0     Sig: Take 1 tablet (10 mg total) by mouth nightly as needed for Muscle spasms.    naproxen 500 MG Oral Tab 10 tablet 0     Sig: Take 1 tablet (500 mg total) by mouth 2 (two) times daily with meals.       Imaging & Consults:  OP REFERRAL TO [facility]  [facility] - [facility]NAL    Return in about 3 weeks (around 6/24/2025) for blood pressure  follow up. .  Patient Instructions             Take Naproxen, one tab twice daily until all tabs are gone. Space doses 12 hours apart for best effect. TAKE DOSES WITH FOOD. Do not take any Advil, Motrin, Aleve or ibuprofen products while taking this medication.      It is ok to take acetaminophen (Tylenol) while taking this medication. If you have regular strength tylenol you may take 3 tabs up to 3 times daily. If you have extra-strength tylenol you may take 2 tabs up to 3 times daily.      Take one cyclobenzaprine tab nightly, before bed, for three (3) nights. Then may take nightly as needed. This medication will likely make you drowsy. Do not drive, operate machinery or make important decisions after taking this medication. Do not drink alcohol while taking this medication.    Apply warm compress or heating pad to back 3-4 times daily for 15-20 minutes each time.     Go to physical therapy as directed.       All questions were answered and the patient understands the plan.            [1]   Past Medical History:   Acute pharyngitis    Allergic rhinitis    Anemia    Biliary cirrhosis (HCC)    Bursitis    Cancer (HCC)    Stage 4 liver cancer    Contact dermatitis and other eczema, due to unspecified cause    Decorative tattoo    Easy bruising    Esophageal reflux    Excessive or frequent menstruation    Headache disorder    Heart palpitations    Heartburn    Irregular bowel habits    Leaking of urine    Migraine, unspecified, without mention of intractable migraine without mention of status migrainosus    Obesity    A constant struggle    Primary biliary cirrhosis (HCC)    Sprain of neck    Weight gain   [2]   Patient Active Problem List  Diagnosis    Trigeminal neuralgia    Primary biliary cirrhosis (HCC)    Vitamin D deficiency    Cyst of ovary    S/P BENIETZ (total abdominal hysterectomy)    Special screening for malignant neoplasm of colon    Benign neoplasm of sigmoid colon   [3]   Current Outpatient Medications    Medication Sig Dispense Refill    losartan 50 MG Oral Tab Take 1 tablet (50 mg total) by mouth daily. 90 tablet 0    cyclobenzaprine 10 MG Oral Tab Take 1 tablet (10 mg total) by mouth nightly as needed for Muscle spasms. 10 tablet 0    naproxen 500 MG Oral Tab Take 1 tablet (500 mg total) by mouth 2 (two) times daily with meals. 10 tablet 0    Omega-3 Fatty Acids (FISH OIL) 600 MG Oral Cap Take 1 capsule by mouth daily.      SUMAtriptan 50 MG Oral Tab 1 tablet at onset of migraine, repeat in 2 hours if needed 9 tablet 3    Calcium Carbonate (CALCIUM 500 OR) Take by mouth.      ondansetron (ZOFRAN) 4 mg tablet Take 1 tablet (4 mg total) by mouth every 8 (eight) hours as needed for Nausea. 20 tablet 0    Ascorbic Acid (VITAMIN C) 1000 MG Oral Tab Take 1 tablet (1,000 mg total) by mouth daily.      Cholecalciferol (VITAMIN D3) 25 MCG (1000 UT) Oral Cap Take 3 tablets by mouth daily.

## 2025-06-16 ENCOUNTER — TELEPHONE (OUTPATIENT)
Dept: PHYSICAL THERAPY | Facility: HOSPITAL | Age: 55
End: 2025-06-16

## 2025-06-17 ENCOUNTER — OFFICE VISIT (OUTPATIENT)
Dept: PHYSICAL THERAPY | Age: 55
End: 2025-06-17
Attending: NURSE PRACTITIONER
Payer: COMMERCIAL

## 2025-06-17 DIAGNOSIS — M79.651 PAIN OF RIGHT THIGH: Primary | ICD-10-CM

## 2025-06-17 PROCEDURE — 97161 PT EVAL LOW COMPLEX 20 MIN: CPT

## 2025-06-17 PROCEDURE — 97110 THERAPEUTIC EXERCISES: CPT

## 2025-06-17 NOTE — PROGRESS NOTES
LOWER EXTREMITY EVALUATION:     Diagnosis:   Pain of right thigh Patient:  Landy Gibbs (55 year old, female)        Referring Provider: Marc Stokes  Today's Date   2025    Precautions:  None   Date of Evaluation: 25  Next MD visit: 2025  Date of Surgery: None     PATIENT SUMMARY     Summary of chief complaints: c/o pain in L Lumabr, Glut, R Thigh and Leg.  History of current condition: Pain since 2025, No hx of trauma   Pain level: current 2 /10, at best 5 /10, at worst 8 /10  Description of symptoms: Pain is constant and increases wheile walking/ Standing/ going Up-Down stairs   Occupation: . Has to sit/ stand / walk during work   Leisure activities/Hobbies: walking   Prior level of function: Able to function without pain  Current limitations: Painful functional mobility  Pt goals: To be able to stand/ walk/ work without RLE pain     Landy  has a past medical history of Acute pharyngitis, Allergic rhinitis (Not sure), Anemia, Biliary cirrhosis (HCC) (2012), Bursitis, Cancer (HCC) (2022 (mother)), Contact dermatitis and other eczema, due to unspecified cause, Decorative tattoo, Easy bruising, Esophageal reflux, Excessive or frequent menstruation, Headache disorder, Heart palpitations, Heartburn, Irregular bowel habits, Leaking of urine, Migraine, unspecified, without mention of intractable migraine without mention of status migrainosus, Obesity (10 years ago), Primary biliary cirrhosis (HCC), Sprain of neck, and Weight gain.  She  has a past surgical history that includes other surgical history (2011); tonsillectomy (2003); appendectomy; hysterectomy (06/15/2017); oophorectomy; colonoscopy; total abdom hysterectomy; needle biopsy liver; and  (91, 93 and 96).Hx of Back injury yrs ago    ASSESSMENT  Landy presents to physical therapy evaluation with primary c/o c/o pain in L Lumabr, Glut, R Thigh and Leg.. The results of the objective tests  and measures show Patient has R Thigh . R Glut and R Lumbar pain. Peripheral pain ( R Thigh pain was abolished after Loaded Lumbar Ext indicating Ext directional preferance. Patient will benefit from further eassessment to address lumbar cause of R thigh pain. Functional deficits include but are not limited to Painful functional mobility. Signs and symptoms are consistent with diagnosis of Pain of right thigh. Pt and PT discussed evaluation findings, pathology, POC and HEP.  Pt voiced understanding and performs HEP correctly without reported pain. Skilled Physical Therapy is medically necessary to address the above impairments and reach functional goals.    OBJECTIVE:      Musculoskeletal  Observation: Spinal Curvature appear to be normal.  Palpation: Min Tenderness at R Thigh Joshua- Lat aspect.   Accessory Motion: NT     Edema: No edema noted   Special Tests:NT     ROM and Strength: (* denotes performed with pain)  Trunk ROM MMT (-/5)     Flex WFL       Ext 50 % Limitation      R L R L     Side bend WFL WFL         Rotation           ,   Hip   ROM MMT (-/5)    R L R L     Flex (L2) WFL WFL -4/5 5/5     Ext  WFL WFL 3/5 +3/5     Abd WFL WFL -4/5 4/5     ER WFL produces pain at Ant aspect of R Thigh WFL         IR WFL WFL            Flexibility:    LE Flexibility R L     Hip Flexor mod restricted       Hamstrings min restricted min restricted     ITB min restricted min restricted     Piriformis         Quads mod restricted min restricted     Gastroc-soleus mod restricted       LE Flexibility Other R L              Neurological:  Sensation:       Balance and Functional Mobility:  Gait: pt ambulates on level ground with normal mechanics   Tandem Stance: R back:  ; L back:    SLS: R  ,  L    Functional Mobility:  5x sit to stand test:     TUG:     Stairs:      Today's Treatment and Response:   Pt education was provided on exam findings, treatment diagnosis, treatment plan, expectations, and prognosis.    Today's  Treatment       6/17/2025   LE Treatment   Therapeutic Exercise DKTC 20 reps NE on Thigh pain  TM Amb 5 mins produced Lumbar abd R Thigh Ant pain  PPU 20 reps NE on pain  Lumbar Ext over fulcrum 20 reps Abolished R Thigh pain    Therapeutic Exercise Minutes 15   Evaluation Minutes 35   Total Time Of Timed Procedures 15   Total Time Of Service-Based Procedures 35   Total Treatment Time 50        Patient was instructed in and issued a HEP for:      Charges:  PT EVAL: Low Complexity,    In agreement with evaluation findings and clinical rationale, this evaluation involved LOW COMPLEXITY decision making due to no personal factors/comorbidities, 1-2 body structures involved/activity limitations, and stable symptoms as documented in the evaluation.                                                                                                                PLAN OF CARE:      Goals: (to be met in 12 visits)   1: Improve     Hip AROM to WFL to enable patient to ambulate with symmetrical gait pattern Met / Not Met / Partially met  2: Improve     Hip Muscle strength to      to enable patient to ambulate unlimited distances with or without right ear. Met/ Not mat/ Partially met  3 Patient will be able to negotiate stairs using reciprocal stepping pattern Met/ Not met/ Partially met  4:Patient will be able to return to his Occupational duties Met/ Not met/ Partially met.  5: Improve LEFS from  70%    to   80%    . Met/ Not met/Partially met  6: Patient will be able to perform all functional mobility with min to no R Lumbar and Hip pain     Frequency / Duration: Patient will be seen 2 X per weekx/week or a total of 12  visits over a 90 day period. Treatment will include: Manual Therapy; Neuromuscular Re-education; Therapeutic Activities; Therapeutic Exercise; Home Exercise Program instruction; Electrical stimulation (unattended); Patient/Family Education    Education or treatment limitation: None   Rehab Potential:  good     LEFS Score  LEFS Score: (Patient-Rptd) 70 % (6/16/2025  4:46 PM)      Patient/Family/Caregiver was advised of these findings, precautions, and treatment options and has agreed to actively participate in planning and for this course of care.    Thank you for your referral. Please co-sign or sign and return this letter via fax as soon as possible to [phone]. If you have any questions, please contact me at Dept: [phone]    Sincerely,  Electronically signed by therapist: Dima Yepez PT  Physician's certification required: Yes  I certify the need for these services furnished under this plan of treatment and while under my care.    X___________________________________________________ Date____________________    Certification From: 6/17/2025  To: 9/15/2025

## 2025-06-24 ENCOUNTER — OFFICE VISIT (OUTPATIENT)
Dept: FAMILY MEDICINE CLINIC | Facility: CLINIC | Age: 55
End: 2025-06-24
Payer: COMMERCIAL

## 2025-06-24 VITALS
SYSTOLIC BLOOD PRESSURE: 154 MMHG | OXYGEN SATURATION: 98 % | BODY MASS INDEX: 42.1 KG/M2 | DIASTOLIC BLOOD PRESSURE: 106 MMHG | RESPIRATION RATE: 18 BRPM | HEIGHT: 61 IN | WEIGHT: 223 LBS | HEART RATE: 85 BPM

## 2025-06-24 DIAGNOSIS — M79.651 PAIN OF RIGHT THIGH: ICD-10-CM

## 2025-06-24 DIAGNOSIS — E66.813 CLASS 3 SEVERE OBESITY DUE TO EXCESS CALORIES WITH SERIOUS COMORBIDITY AND BODY MASS INDEX (BMI) OF 40.0 TO 44.9 IN ADULT: ICD-10-CM

## 2025-06-24 DIAGNOSIS — I10 ESSENTIAL HYPERTENSION: Primary | ICD-10-CM

## 2025-06-24 PROCEDURE — 99214 OFFICE O/P EST MOD 30 MIN: CPT | Performed by: NURSE PRACTITIONER

## 2025-06-24 RX ORDER — LOSARTAN POTASSIUM AND HYDROCHLOROTHIAZIDE 12.5; 1 MG/1; MG/1
1 TABLET ORAL DAILY
Qty: 30 TABLET | Refills: 1 | Status: SHIPPED | OUTPATIENT
Start: 2025-06-24

## 2025-06-24 NOTE — PROGRESS NOTES
Landy Gibbs is a 55 year old female presenting for follow up of HTN   Chief Complaint   Patient presents with    Blood Pressure     F/u      HPI:     Patient presents for follow up of HTN. At last visit on 6/3 management was changed to increase losartan to 50mg daily. In office today reports has been taking medications as ordered, w/o reported side effects. Is checking BP at home. Reported in the range of 160's/100's. Denies chest pain, SOB, NOYOLA, headaches, dizziness, lightheadedness, visual changes.     Also, had complaints of pain to right thigh at visit on 6/3, felt to be relate to back pain. Managed with naproxen, cyclobenzaprine and referral to PT. In office today reports does still have some pain to thigh, slightly improved, is seeing PT as ordered.     Obesity- reports has been watching diet and exercising with brisk walk for 45 minutes each morning but has not been able to have successful weight loss. Is interested in medication management for this if possible.     Current Medications[1]   Past Medical History[2]   Past Surgical History[3]   Social History:  Smoking: Denies     REVIEW OF SYSTEMS:   GENERAL HEALTH: feels well otherwise  RESPIRATORY: as above  CARDIOVASCULAR: as above  NEURO: as above    EXAM:   BP (!) 154/106   Pulse 85   Resp 18   Ht 5' 1\" (1.549 m)   Wt 223 lb (101.2 kg)   LMP 05/30/2017 (Exact Date)   SpO2 98%   BMI 42.14 kg/m²   GENERAL: well developed, well nourished,in no apparent distress  HEENT: atraumatic, normocephalic  NECK: supple,no adenopathy,no bruits  LUNGS: clear to auscultation bilaterally w/o wheezes, rhonchi or rales.   CARDIO: RRR without murmur  EXTREMITIES: no cyanosis, clubbing or edema    ASSESSMENT AND PLAN:     Encounter Diagnoses   Name Primary?    Essential hypertension- increase to losartan/hydrochlorothiazide 100/12.5mg. see me in 3-4 weeks for BP follow up, sooner if issues. Verbalized understanding of instructions and agreeable to this plan of care.     Yes    Pain of right thigh- continue with PT for now. Will follow up as above.        Class 3 severe obesity due to excess calories with serious comorbidity and body mass index (BMI) of 40.0 to 44.9 in adult- Wegovy 0.25mg. Medication administration, use and side effects discussed. See me in 3-4 weeks for follow up, sooner if worsening. Verbalized understanding of instructions and agreeable to this plan of care.           No orders of the defined types were placed in this encounter.      Meds & Refills for this Visit:  Requested Prescriptions     Signed Prescriptions Disp Refills    Losartan Potassium-HCTZ 100-12.5 MG Oral Tab 30 tablet 1     Sig: Take 1 tablet by mouth daily.    semaglutide-weight management 0.25 MG/0.5ML Subcutaneous Solution Auto-injector 2 mL 0     Sig: Inject 0.5 mL (0.25 mg total) into the skin once a week for 4 doses.       Imaging & Consults:  None    Return in about 3 weeks (around 7/15/2025) for Blood pressure follow up. .  Patient Instructions                 Take powdered form of metamucil every other day.     Call [phone] to schedule a CT calcium scoring test.              [1]   Current Outpatient Medications   Medication Sig Dispense Refill    Losartan Potassium-HCTZ 100-12.5 MG Oral Tab Take 1 tablet by mouth daily. 30 tablet 1    semaglutide-weight management 0.25 MG/0.5ML Subcutaneous Solution Auto-injector Inject 0.5 mL (0.25 mg total) into the skin once a week for 4 doses. 2 mL 0    Omega-3 Fatty Acids (FISH OIL) 600 MG Oral Cap Take 1 capsule by mouth daily.      SUMAtriptan 50 MG Oral Tab 1 tablet at onset of migraine, repeat in 2 hours if needed 9 tablet 3    Calcium Carbonate (CALCIUM 500 OR) Take by mouth.      ondansetron (ZOFRAN) 4 mg tablet Take 1 tablet (4 mg total) by mouth every 8 (eight) hours as needed for Nausea. 20 tablet 0    Ascorbic Acid (VITAMIN C) 1000 MG Oral Tab Take 1 tablet (1,000 mg total) by mouth daily.      Cholecalciferol (VITAMIN D3) 25 MCG  (1000 UT) Oral Cap Take 3 tablets by mouth daily.     [2]   Past Medical History:   Acute pharyngitis    Allergic rhinitis    Anemia    Biliary cirrhosis (HCC)    Bursitis    Cancer (HCC)    Stage 4 liver cancer    Contact dermatitis and other eczema, due to unspecified cause    Decorative tattoo    Easy bruising    Esophageal reflux    Excessive or frequent menstruation    Headache disorder    Heart palpitations    Heartburn    Irregular bowel habits    Leaking of urine    Migraine, unspecified, without mention of intractable migraine without mention of status migrainosus    Obesity    A constant struggle    Primary biliary cirrhosis (HCC)    Sprain of neck    Weight gain   [3]   Past Surgical History:  Procedure Laterality Date    Appendectomy      2016    Colonoscopy      Hysterectomy  06/15/2017    right ovary present    Needle biopsy liver        91, 93 and 96    Oophorectomy      left only removed    Other surgical history  2011    liver biopsy    Tonsillectomy  2003    Total abdom hysterectomy

## 2025-06-24 NOTE — PATIENT INSTRUCTIONS
Take powdered form of metamucil every other day.     Call [phone] to schedule a CT calcium scoring test.

## 2025-06-25 ENCOUNTER — TELEPHONE (OUTPATIENT)
Dept: FAMILY MEDICINE CLINIC | Facility: CLINIC | Age: 55
End: 2025-06-25

## 2025-06-25 DIAGNOSIS — E66.813 CLASS 3 SEVERE OBESITY DUE TO EXCESS CALORIES WITH SERIOUS COMORBIDITY AND BODY MASS INDEX (BMI) OF 40.0 TO 44.9 IN ADULT: ICD-10-CM

## 2025-06-26 ENCOUNTER — OFFICE VISIT (OUTPATIENT)
Dept: PHYSICAL THERAPY | Age: 55
End: 2025-06-26
Attending: NURSE PRACTITIONER
Payer: COMMERCIAL

## 2025-06-26 PROCEDURE — 97110 THERAPEUTIC EXERCISES: CPT

## 2025-06-26 PROCEDURE — 97140 MANUAL THERAPY 1/> REGIONS: CPT

## 2025-06-27 NOTE — PROGRESS NOTES
Patient: Landy Gibbs (55 year old, female) Referring Provider:  Insurance:   Diagnosis: R Knee Scar and Patellar Mobs ; PROM to improve Flexion Marc STRICKLAND   Date of Surgery: None Next MD visit:  N/A   Precautions:  None 06/24/2025 Referral Information:    Date of Evaluation: Req: 0, Auth: 0, Exp:     06/17/25 POC Auth Visits:          Today's Date   6/26/2025    Subjective   P stated that she was compliant with HEP and she had pain relief after exercise.        Pain:  2/10     Objective   See Trteatment log              Assessment   P had soft tissue restriction in R Quad and ITB areas. Performed Foam roll MFR to address this    Goals (to be met in 12 visits)   1: Improve     Hip AROM to WFL to enable patient to ambulate with symmetrical gait pattern Met / Not Met / Partially met  2: Improve     Hip Muscle strength to      to enable patient to ambulate unlimited distances with or without right ear. Met/ Not mat/ Partially met  3 Patient will be able to negotiate stairs using reciprocal stepping pattern Met/ Not met/ Partially met  4:Patient will be able to return to his Occupational duties Met/ Not met/ Partially met.  5: Improve LEFS from  70%    to   80%    . Met/ Not met/Partially met  6: Patient will be able to perform all functional mobility with min to no R Lumbar and Hip pain         Plan  Cont tx per patient's response to HEP  Treatment Last 4 Visits  Treatment Day:         6/17/2025 6/26/2025   LE Treatment   Therapeutic Exercise DKTC 20 reps NE on Thigh pain  TM Amb 5 mins produced Lumbar abd R Thigh Ant pain  PPU 20 reps NE on pain  Lumbar Ext over fulcrum 20 reps Abolished R Thigh pain  NU Step L 5 for 10 mins  R Gastro stretch against wall 30 secs X 3  Hip Flexor stretch on Step 30 secs X 3  Prone R Hip Ext 20 repd   SL Clam 20 reps    Manual Therapy  Oam Roll to R Thigh Ant and Lat aspect.   Therapeutic Exercise Minutes 15 25   Manual Therapy Minutes  15   Evaluation Minutes 35     Total Time Of Timed Procedures 15 40   Total Time Of Service-Based Procedures 35 0   Total Treatment Time 50 40        HEP   R Gastro stretch against wall 30 secs X 3  Hip Flexor stretch on Step 30 secs X 3  Prone R Hip Ext 20 repd   SL Clam 20 reps     Charges  Low Complexity

## 2025-07-01 ENCOUNTER — OFFICE VISIT (OUTPATIENT)
Dept: PHYSICAL THERAPY | Age: 55
End: 2025-07-01
Attending: NURSE PRACTITIONER
Payer: COMMERCIAL

## 2025-07-01 DIAGNOSIS — M79.651 PAIN OF RIGHT THIGH: Primary | ICD-10-CM

## 2025-07-01 PROCEDURE — 97110 THERAPEUTIC EXERCISES: CPT

## 2025-07-01 PROCEDURE — 97140 MANUAL THERAPY 1/> REGIONS: CPT

## 2025-07-01 NOTE — PROGRESS NOTES
Patient: Landy Gibbs (55 year old, female) Referring Provider:  Insurance:   Diagnosis: Pain of right thigh Marc MCCONNELL INS   Date of Surgery: None Next MD visit:  N/A   Precautions:  None 06/24/2025 Referral Information:    Date of Evaluation: Req: 0, Auth: 0, Exp:     06/17/25 POC Auth Visits:          Today's Date   7/1/2025    Subjective   Patient reported that she has pain during sit<> stand , Pivot turning to L  and Up,. Down stairs in L Quad area.        Pain:  1/0     Objective    See Treatment log             Assessment   Reassess for lumbar cause of Ant thigh pain. Lumbar Flexion and Ext did not affect pain. Patient was instructed to stop all other exercises and start doing Dynamic R Hip Flexor and ITB stretch and continue Standing Lumbar Ext over fulcrum.     Goals (to be met in 12 visits)   1: Improve     Hip AROM to WFL to enable patient to ambulate with symmetrical gait pattern Met / Not Met / Partially met  2: Improve     Hip Muscle strength to      to enable patient to ambulate unlimited distances with or without right ear. Met/ Not mat/ Partially met  3 Patient will be able to negotiate stairs using reciprocal stepping pattern Met/ Not met/ Partially met  4:Patient will be able to return to his Occupational duties Met/ Not met/ Partially met.  5: Improve LEFS from  70%    to   80%    . Met/ Not met/Partially met  6: Patient will be able to perform all functional mobility with min to no R Lumbar and Hip pain             Plan   Continue PT per patient's response to HEP.    Treatment Last 4 Visits  Treatment Day:         6/17/2025 6/26/2025 7/1/2025   LE Treatment   Therapeutic Exercise DKTC 20 reps NE on Thigh pain  TM Amb 5 mins produced Lumbar abd R Thigh Ant pain  PPU 20 reps NE on pain  Lumbar Ext over fulcrum 20 reps Abolished R Thigh pain  NU Step L 5 for 10 mins  R Gastro stretch against wall 30 secs X 3  Hip Flexor stretch on Step 30 secs X 3  Prone R Hip Ext 20 repd   SL Clam 20  reps  Nu Step L 5 for 10 mins  TM Amb at 1.8 mph 5 mins   Fwd Lunge with RLE on 8 Inch Step 15 reps Produced R Ant thigh pain.  R Gastro stretch against wall 30 secs X 3  Hip Flexor stretch on Step 30 secs X 3  Lumbar Ext over fulcrum 20 reps No pain  Lumbar Flex in Loaded and unloaded position 20 reps No pain.  Dynamic Stretch R Hip Flexor, R ITB ( Standing) Lumbar Ext 20 reps each ( Added to HEP)     Manual Therapy  Oam Roll to R Thigh Ant and Lat aspect. Foam Roll to R Thigh Lat/ Ant aspect.   Therapeutic Exercise Minutes 15 25 35   Manual Therapy Minutes  15 10   Evaluation Minutes 35     Total Time Of Timed Procedures 15 40 45   Total Time Of Service-Based Procedures 35 0 0   Total Treatment Time 50 40 45        HEP       Charges  Low Complexity

## 2025-07-03 ENCOUNTER — OFFICE VISIT (OUTPATIENT)
Dept: PHYSICAL THERAPY | Age: 55
End: 2025-07-03
Attending: NURSE PRACTITIONER
Payer: COMMERCIAL

## 2025-07-03 DIAGNOSIS — M79.651 PAIN OF RIGHT THIGH: Primary | ICD-10-CM

## 2025-07-03 PROCEDURE — 97110 THERAPEUTIC EXERCISES: CPT

## 2025-07-03 NOTE — PROGRESS NOTES
Patient: Landy Gibbs (55 year old, female) Referring Provider:  Insurance:   Diagnosis: Pain of right thigh Marc Stokes AEJANETHFEDERICO MARCO A   Date of Surgery: None Next MD visit:  N/A   Precautions:  None 06/24/2025 Referral Information:    Date of Evaluation: Req: 0, Auth: 0, Exp:     06/17/25 POC Auth Visits:          Today's Date   7/3/2025    Subjective   P reports increased pain today. Pain in R Glut , Ant Thigh.        Pain:  5/10     Objective    See Treatment log             Assessment   HEP adjust to assess pain reduction. Cont 1 more session , DC to MD  if no pain relief    Goals (to be met in 12 visits)   1: Improve     Hip AROM to WFL to enable patient to ambulate with symmetrical gait pattern Met / Not Met / Partially met  2: Improve     Hip Muscle strength to      to enable patient to ambulate unlimited distances with or without right ear. Met/ Not mat/ Partially met  3 Patient will be able to negotiate stairs using reciprocal stepping pattern Met/ Not met/ Partially met  4:Patient will be able to return to his Occupational duties Met/ Not met/ Partially met.  5: Improve LEFS from  70%    to   80%    . Met/ Not met/Partially met  6: Patient will be able to perform all functional mobility with min to no R Lumbar and Hip pain                 Plan       Treatment Last 4 Visits  Treatment Day:         6/17/2025 6/26/2025 7/1/2025 7/3/2025   LE Treatment   Therapeutic Exercise DKTC 20 reps NE on Thigh pain  TM Amb 5 mins produced Lumbar abd R Thigh Ant pain  PPU 20 reps NE on pain  Lumbar Ext over fulcrum 20 reps Abolished R Thigh pain  NU Step L 5 for 10 mins  R Gastro stretch against wall 30 secs X 3  Hip Flexor stretch on Step 30 secs X 3  Prone R Hip Ext 20 repd   SL Clam 20 reps  Nu Step L 5 for 10 mins  TM Amb at 1.8 mph 5 mins   Fwd Lunge with RLE on 8 Inch Step 15 reps Produced R Ant thigh pain.  R Gastro stretch against wall 30 secs X 3  Hip Flexor stretch on Step 30 secs X 3  Lumbar Ext over fulcrum  20 reps No pain  Lumbar Flex in Loaded and unloaded position 20 reps No pain.  Dynamic Stretch R Hip Flexor, R ITB ( Standing) Lumbar Ext 20 reps each ( Added to HEP)   Nu Step L 4 fro 10 mins  B Calf stretch on RB 30 secs x 3  R Hip Flexor stretch 30 secs X 3  ITB Stretch in Standing 10 X 10 secs.  Supine SKTC 10 reps ( Added to HEP)  Supine ( Hip / Knee Flexed) Hip Abd 20 reps ( Added to HEP)   Manual Therapy  Oam Roll to R Thigh Ant and Lat aspect. Foam Roll to R Thigh Lat/ Ant aspect.    Therapeutic Exercise Minutes 15 25 35    Manual Therapy Minutes  15 10    Evaluation Minutes 35      Total Time Of Timed Procedures 15 40 45 0   Total Time Of Service-Based Procedures 35 0 0 0   Total Treatment Time 50 40 45 0        HEP       Charges

## 2025-07-08 ENCOUNTER — OFFICE VISIT (OUTPATIENT)
Dept: PHYSICAL THERAPY | Age: 55
End: 2025-07-08
Attending: NURSE PRACTITIONER
Payer: COMMERCIAL

## 2025-07-08 DIAGNOSIS — M79.651 PAIN OF RIGHT THIGH: Primary | ICD-10-CM

## 2025-07-08 PROCEDURE — 97140 MANUAL THERAPY 1/> REGIONS: CPT

## 2025-07-08 PROCEDURE — 97110 THERAPEUTIC EXERCISES: CPT

## 2025-07-08 NOTE — PROGRESS NOTES
Patient: Landy Gibbs (55 year old, female) Referring Provider:  Insurance:   Diagnosis: Pain of right thigh Marc STRICKLAND   Date of Surgery: None Next MD visit:  N/A   Precautions:  None 06/24/2025 Referral Information:    Date of Evaluation: Req: 0, Auth: 0, Exp:     06/17/25 POC Auth Visits:          Today's Date   7/8/2025    Subjective   P reports she has 1/10 pain most of the time, however she still has severe pain in R thigh at night and is not able to sleep. She also reports pain during negotiating stairs.       Pain: 0/10     Objective     B Hip and Knee AROM is WFL , R Hip Ms strength is 4/5 P still has R ITB and Hip Flexor tightness. P              Assessment   Patient has decreased pain level when she perform normal functional mobility but Going UP-Down stair produces pain in R Thigh Ant aspect. P indicated pain mostly in suprapatellar area R Knee during stairs. She reports difficulty in sleeping due to pain.    Goals (to be met in 8 visits)   1: Improve     Hip AROM to WFL to enable patient to ambulate with symmetrical gait pattern. MET  2: Improve     Hip Muscle strength to 4/5     to enable patient to ambulate unlimited distances with or without right ear. Met  3 Patient will be able to negotiate stairs using reciprocal stepping pattern  Not met  4:Patient will be able to return to his Occupational duties Not Met  5: Improve LEFS from  70%    to   80%    . Not met LEFS on 7/8/25 is 63.75%  6: Patient will be able to perform all functional mobility with min to no R Lumbar and Hip pain Met     Plan   Patient has achieved 3 out of 6 goals but the outcome measure score has declined. She has completed 5 PT sessions. Patient is being referred back to MD for further advice.    Treatment Last 4 Visits  Treatment Day: 5       6/26/2025 7/1/2025 7/3/2025 7/8/2025   LE Treatment   Therapeutic Exercise NU Step L 5 for 10 mins  R Gastro stretch against wall 30 secs X 3  Hip Flexor stretch on Step 30  secs X 3  Prone R Hip Ext 20 repd   SL Clam 20 reps  Nu Step L 5 for 10 mins  TM Amb at 1.8 mph 5 mins   Fwd Lunge with RLE on 8 Inch Step 15 reps Produced R Ant thigh pain.  R Gastro stretch against wall 30 secs X 3  Hip Flexor stretch on Step 30 secs X 3  Lumbar Ext over fulcrum 20 reps No pain  Lumbar Flex in Loaded and unloaded position 20 reps No pain.  Dynamic Stretch R Hip Flexor, R ITB ( Standing) Lumbar Ext 20 reps each ( Added to HEP)   Nu Step L 4 fro 10 mins  B Calf stretch on RB 30 secs x 3  R Hip Flexor stretch 30 secs X 3  ITB Stretch in Standing 10 X 10 secs.  Supine SKTC 10 reps ( Added to HEP)  Supine ( Hip / Knee Flexed) Hip Abd 20 reps ( Added to HEP) Nu Step L 4 fro 10 mins  B Calf stretch on RB 30 secs x 3  R Hip Flexor stretch 30 secs X 3  ITB Stretch in Standing 10 X 10 secs.  Supine SKTC 10 reps ( Added to HEP)  Supine ( Hip / Knee Flexed) Hip Abd 20 reps ( Added to HEP)     Manual Therapy Oam Roll to R Thigh Ant and Lat aspect. Foam Roll to R Thigh Lat/ Ant aspect.  Foam Roll to Right Thigh   Therapeutic Exercise Minutes 25 35     Manual Therapy Minutes 15 10     Total Time Of Timed Procedures 40 45 0 0   Total Time Of Service-Based Procedures 0 0 0 0   Total Treatment Time 40 45 0 0        HEP   As per Treatment Section    Charges  Low Complexity

## 2025-07-14 ENCOUNTER — OFFICE VISIT (OUTPATIENT)
Dept: FAMILY MEDICINE CLINIC | Facility: CLINIC | Age: 55
End: 2025-07-14
Payer: COMMERCIAL

## 2025-07-14 VITALS
DIASTOLIC BLOOD PRESSURE: 92 MMHG | HEART RATE: 82 BPM | RESPIRATION RATE: 18 BRPM | WEIGHT: 220 LBS | OXYGEN SATURATION: 97 % | HEIGHT: 61 IN | BODY MASS INDEX: 41.54 KG/M2 | SYSTOLIC BLOOD PRESSURE: 130 MMHG

## 2025-07-14 DIAGNOSIS — M79.651 PAIN OF RIGHT THIGH: ICD-10-CM

## 2025-07-14 DIAGNOSIS — I10 ESSENTIAL HYPERTENSION: Primary | ICD-10-CM

## 2025-07-14 DIAGNOSIS — G43.909 MIGRAINE WITHOUT STATUS MIGRAINOSUS, NOT INTRACTABLE, UNSPECIFIED MIGRAINE TYPE: ICD-10-CM

## 2025-07-14 PROCEDURE — 99214 OFFICE O/P EST MOD 30 MIN: CPT | Performed by: NURSE PRACTITIONER

## 2025-07-14 RX ORDER — AMLODIPINE BESYLATE 5 MG/1
5 TABLET ORAL DAILY
Qty: 90 TABLET | Refills: 0 | Status: SHIPPED | OUTPATIENT
Start: 2025-07-14

## 2025-07-14 RX ORDER — CYCLOBENZAPRINE HCL 5 MG
5 TABLET ORAL NIGHTLY PRN
Qty: 20 TABLET | Refills: 0 | Status: SHIPPED | OUTPATIENT
Start: 2025-07-14 | End: 2025-08-03

## 2025-07-14 RX ORDER — SUMATRIPTAN 50 MG/1
TABLET, FILM COATED ORAL
Qty: 9 TABLET | Refills: 3 | Status: SHIPPED | OUTPATIENT
Start: 2025-07-14

## 2025-07-14 NOTE — PROGRESS NOTES
Chief Complaint   Patient presents with    Blood Pressure     F/u      HPI:  Presents for follow up of several chronic issues:     HTN- at last visit with me on 6/24/25, management was changed losartan/hydrochlorothiazide 100/12.5mg. In office today reports stated taking medications as ordered, w/o reported side effects. Is  checking BP at home, readings typically in 140's/90's. Denies chest pain, palpitations, SOB, NOYOLA, headaches, dizziness, lightheadedness, visual changes, muscle aches, joint pain, abd bloating, cramping.    Migraines- notes increase in frequency lately. Manages with sumatriptan with good relief. Would like refill. Denies dizziness, syncope, N/T/weakness to any body parts.     Right thigh pain - continues having right thigh pains, worse at night. Has been doing PT as referred w/o improvement. Notes temporary relief with cyclobenzaprine. Has not made appointment with Dr. Knott yet, wanted to try PT first.     Past Medical History[1]    Problem List[2]    Current Medications[3]    Physical Exam  BP (!) 130/92   Pulse 82   Resp 18   Ht 5' 1\" (1.549 m)   Wt 220 lb (99.8 kg)   LMP 05/30/2017 (Exact Date)   SpO2 97%   BMI 41.57 kg/m²   Constitutional: well developed, well nourished,in no apparent distress  HEENT: Normocephalic and atraumatic.  Eyes: Conjunctivae are pink and moist without exudate or drainage.   Neck: Normal range of motion. Neck supple. No JVD/bruits.  Cardiovascular: Normal rate, regular rhythm.  No murmur.   Pulmonary/Chest: No respiratory distress. Effort normal. Breath sounds clear bilaterally. No wheezes, rhonchi or rales appreciated.   Abdominal: Soft, non-tender, no masses, no organomegaly or hernias. BS normoactive.   Ext: No clubbing, cyanosis or edema noted.   Skin: Skin is warm and dry. No rash noted. No erythema. No pallor.     A/P:    Encounter Diagnoses   Name Primary?    Essential hypertension- continue losartan/hydrochlorothiazide. Add amlodipine 5mg. Medication  administration, use and side effects discussed. See me in 4 weeks for BP recheck. Sooner if issues. Verbalized understanding of instructions and agreeable to this plan of care.    Yes    Migraine without status migrainosus, not intractable, unspecified migraine type- Stable. Continue current management, refills provided.        Pain of right thigh- see Dr. Knott as referred. Cyclobenzaprine refill provided. Verbalized understanding of instructions and agreeable to this plan of care.                No orders of the defined types were placed in this encounter.      Meds & Refills for this Visit:  Requested Prescriptions     Signed Prescriptions Disp Refills    SUMAtriptan 50 MG Oral Tab 9 tablet 3     Si tablet at onset of migraine, repeat in 2 hours if needed    amLODIPine 5 MG Oral Tab 90 tablet 0     Sig: Take 1 tablet (5 mg total) by mouth daily.    cyclobenzaprine 5 MG Oral Tab 20 tablet 0     Sig: Take 1 tablet (5 mg total) by mouth nightly as needed for Muscle spasms.       Imaging & Consults:  None    Return in about 4 weeks (around 2025) for blood pressure follow up. .  There are no Patient Instructions on file for this visit.    All questions were answered and the patient understands the plan.              [1]   Past Medical History:   Acute pharyngitis    Allergic rhinitis    Anemia    Biliary cirrhosis (HCC)    Bursitis    Cancer (HCC)    Stage 4 liver cancer    Contact dermatitis and other eczema, due to unspecified cause    Decorative tattoo    Easy bruising    Esophageal reflux    Excessive or frequent menstruation    Headache disorder    Heart palpitations    Heartburn    Irregular bowel habits    Leaking of urine    Migraine, unspecified, without mention of intractable migraine without mention of status migrainosus    Obesity    A constant struggle    Primary biliary cirrhosis (HCC)    Sprain of neck    Weight gain   [2]   Patient Active Problem List  Diagnosis    Trigeminal neuralgia     Primary biliary cirrhosis (HCC)    Vitamin D deficiency    Cyst of ovary    S/P BENITEZ (total abdominal hysterectomy)    Special screening for malignant neoplasm of colon    Benign neoplasm of sigmoid colon   [3]   Current Outpatient Medications   Medication Sig Dispense Refill    SUMAtriptan 50 MG Oral Tab 1 tablet at onset of migraine, repeat in 2 hours if needed 9 tablet 3    amLODIPine 5 MG Oral Tab Take 1 tablet (5 mg total) by mouth daily. 90 tablet 0    cyclobenzaprine 5 MG Oral Tab Take 1 tablet (5 mg total) by mouth nightly as needed for Muscle spasms. 20 tablet 0    Losartan Potassium-HCTZ 100-12.5 MG Oral Tab Take 1 tablet by mouth daily. 30 tablet 1    Omega-3 Fatty Acids (FISH OIL) 600 MG Oral Cap Take 1 capsule by mouth daily.      Calcium Carbonate (CALCIUM 500 OR) Take by mouth.      ondansetron (ZOFRAN) 4 mg tablet Take 1 tablet (4 mg total) by mouth every 8 (eight) hours as needed for Nausea. 20 tablet 0    Ascorbic Acid (VITAMIN C) 1000 MG Oral Tab Take 1 tablet (1,000 mg total) by mouth daily.      Cholecalciferol (VITAMIN D3) 25 MCG (1000 UT) Oral Cap Take 3 tablets by mouth daily.

## 2025-07-31 RX ORDER — LOSARTAN POTASSIUM AND HYDROCHLOROTHIAZIDE 12.5; 1 MG/1; MG/1
1 TABLET ORAL DAILY
Qty: 30 TABLET | Refills: 1 | OUTPATIENT
Start: 2025-07-31

## (undated) DEVICE — SPONGE STICK WITH PVP-I: Brand: KENDALL

## (undated) DEVICE — SUTURE VICRYL 0

## (undated) DEVICE — SUTURE CHROMIC GUT 2-0 SH

## (undated) DEVICE — KENDALL SCD EXPRESS SLEEVES, KNEE LENGTH, MEDIUM: Brand: KENDALL SCD

## (undated) DEVICE — SUTURE VICRYL 0 CT-1

## (undated) DEVICE — DRESSING AQUACEL AG 3.5 X 10

## (undated) DEVICE — LAPAROTOMY CDS: Brand: MEDLINE INDUSTRIES, INC.

## (undated) DEVICE — SUTURE VICRYL 1 CT-1

## (undated) DEVICE — SUTURE PLAIN GUT 2-0 CT

## (undated) DEVICE — GLOVE SURG SENSICARE SZ 6-1/2

## (undated) DEVICE — Device

## (undated) DEVICE — DRAPE,T,LAPARO,TRANS,STERILE: Brand: MEDLINE

## (undated) DEVICE — ADHESIVE MASTISOL 2/3CC VL

## (undated) DEVICE — 3M™ STERI-STRIP™ REINFORCED ADHESIVE SKIN CLOSURES, R1547, 1/2 IN X 4 IN (12 MM X 100 MM), 6 STRIPS/ENVELOPE: Brand: 3M™ STERI-STRIP™

## (undated) DEVICE — PAD SANITARY 10\" STERILE

## (undated) DEVICE — SOL  .9 1000ML BTL

## (undated) DEVICE — SUTURE VICRYL 2-0 CT-1

## (undated) DEVICE — UNDYED BRAIDED (POLYGLACTIN 910), SYNTHETIC ABSORBABLE SUTURE: Brand: COATED VICRYL

## (undated) DEVICE — CHLORAPREP 26ML APPLICATOR

## (undated) NOTE — LETTER
[facility], [address] [facility], [facility]  Geradine Kawasaki     2/16/2021    To Whom It May Concern:    Cecy Puente is scheduled for a physical exam on 3/23/2021 with Dr. Natty Levine.      Sincerel

## (undated) NOTE — MR AVS SNAPSHOT
Sly Melendez  [address], [facility] [address]  [address] [place] [id number]               Thank you for choosing us for your health care visit with Donna Diggs DO.   We are glad to serve you and happy to provide you with this - when to take this  - reasons to take this   Commonly known as:  ZANTAC           TraMADol HCl 50 MG Tabs   Take 50 mg by mouth every 6 (six) hours as needed for Pain.    Commonly known as:  ULTRAM           triamcinolone acetonide 0.1 % Crea   Apply topic

## (undated) NOTE — LETTER
17      Stephen Richardson        :  1970        To Whom It May Concern:    Stephen Richardson  has recently been treated for a medical condition.  At this time, I have determined she may return to work effective 17 part-time( no more then 4 ho

## (undated) NOTE — LETTER
[facility]  [address] Chapin [address] [facility], [address] [place] [address]    Consent for Operation    Date: __________________    Time: _______________    1.  I authorize the performance upon Leroy Smallwood the following operation:    Procedure(s):  TOTAL ABDOMINAL HYST procedure has been videotaped, the surgeon will obtain the original videotape. The hospital will not be responsible for storage or maintenance of this tape.     6. For the purpose of advancing medical education, I consent to the admittance of observers to t STATEMENTS REQUIRING INSERTION OR COMPLETION WERE FILLED IN.     Signature of Patient:   ___________________________    When the patient is a minor or mentally incompetent to give consent:  Signature of person authorized to consent for patient: ____________ drugs/illegal medications). Failure to inform my anesthesiologist about these medicines may increase my risk of anesthetic complications. · If I am allergic to anything or have had a reaction to anesthesia before.     3. I understand how the anesthesia med I have discussed the procedure and information above with the patient (or patient’s representative) and answered their questions. The patient or their representative has agreed to have anesthesia services.     _______________________________________________

## (undated) NOTE — MR AVS SNAPSHOT
[facility] [address], [facility] 100  [address] [id number]               Thank you for choosing us for your health care visit with Tianna Hernandez MD.  We are glad to serve you and happy to provide you with this sum Gyn Problem           Medical Issues Discussed Today     Cyst of ovary    Hx of menorrhagia    Well woman exam with routine gynecological exam    Visit for screening mammogram          Instructions and Information about Your Health     None      Allergies Don’t eat while distracted and slow down. Avoid over sized portions. Don’t eat while when you’re bored.      EAT THESE FOODS MORE OFTEN: EAT THESE FOODS LESS OFTEN:   Make half your plate fruits and vegetables Highly refined, white starches including wh

## (undated) NOTE — ED AVS SNAPSHOT
[facility] in [address] [place] [address] Drive,[address] Floor    [address] [place] [address]    Phone:  [phone]    Fax:  [phone]           Tyrell Susan   MRN: [id number]    Department:  THE [facility] OF [facility] in [facility] END   Date of Visit:  5/31/2017 Discharge Instructions       Please return to the ER/clinic if symptoms worsen. Follow-up with your PCP in 24-48 hours as needed.      -PT will take motrin as prescribed    -Follow Fe rich diet    -F/U ASAP with Gyn for further evaluation of cyst    Dischar this physician (or your personal doctor if your instructions are to return to your personal doctor) about any new or lasting problems. The primary care or specialist physician will see patients referred from the [facility].  Follow-up care is at you to explore options for quitting.     - If you have concerns related to behavioral health issues or thoughts of harming yourself, contact [facility] at [phone].     - If you don’t have insurance, Kirstin Pascual

## (undated) NOTE — LETTER
[facility] CARE IN [place]  [address] [address]  Dept: [phone]  Dept Fax: [phone]      May 31, 2017    Patient: Dylan General   Date of Visit: 5/31/2017       To Whom It May Concern:    Celestine Parra was seen and logan

## (undated) NOTE — IP AVS SNAPSHOT
[facility] [place]  [address] [place], [address] ~ [phone]                Discharge Summary   6/15/2017    Estrella Lechuga           Admission Information        Provider Department    6/15/2017 [facility], [facility] 3nw-A Take 1 tablet (150 mg total) by mouth 2 (two) times daily.     Randall Crome taking these medications        Instructions Authorizing Provider    Morning Afternoon Evening As Needed    acyclovir 5 % Oint   Commonly Tb Intradermal Test 9/27/2016, 9/8/2014      Recent Hematology Lab Results  (Last 3 results in the past 90 days)    WBC RBC Hemoglobin Hematocrit MCV MCH MCHC RDW Platelet MPV    (60/50/43)  11.3 (06/16/17)  4.59 (06/16/17)  8.2 (L) (06/16/17)  29.2 (L) ( medical emergencies, dial 911. Visit [email or web address] to learn more.             _____________________________________________________________________________    Medication Side Effects - Medications to be taken at home  As your caregivers,

## (undated) NOTE — Clinical Note
Dear Dr. Debora Lopez,       Thank you for referring Ova Pollard to the [facility].   Sincerely,  LAISHA Palmer